# Patient Record
Sex: FEMALE | Race: WHITE | Employment: OTHER | URBAN - METROPOLITAN AREA
[De-identification: names, ages, dates, MRNs, and addresses within clinical notes are randomized per-mention and may not be internally consistent; named-entity substitution may affect disease eponyms.]

---

## 2018-09-08 ENCOUNTER — APPOINTMENT (OUTPATIENT)
Dept: GENERAL RADIOLOGY | Age: 83
End: 2018-09-08
Attending: EMERGENCY MEDICINE
Payer: MEDICARE

## 2018-09-08 ENCOUNTER — APPOINTMENT (OUTPATIENT)
Dept: CT IMAGING | Age: 83
End: 2018-09-08
Attending: EMERGENCY MEDICINE
Payer: MEDICARE

## 2018-09-08 ENCOUNTER — HOSPITAL ENCOUNTER (EMERGENCY)
Age: 83
Discharge: HOME | End: 2018-09-08
Attending: EMERGENCY MEDICINE
Payer: MEDICARE

## 2018-09-08 VITALS
TEMPERATURE: 97.8 F | OXYGEN SATURATION: 93 % | RESPIRATION RATE: 20 BRPM | HEART RATE: 84 BPM | DIASTOLIC BLOOD PRESSURE: 75 MMHG | SYSTOLIC BLOOD PRESSURE: 168 MMHG

## 2018-09-08 DIAGNOSIS — R09.82 POSTNASAL DRIP: ICD-10-CM

## 2018-09-08 DIAGNOSIS — R07.89 ATYPICAL CHEST PAIN: Primary | ICD-10-CM

## 2018-09-08 LAB
ALBUMIN SERPL-MCNC: 4.2 G/DL (ref 3.2–4.6)
ALBUMIN/GLOB SERPL: 0.9 {RATIO} (ref 1.2–3.5)
ALP SERPL-CCNC: 64 U/L (ref 50–136)
ALT SERPL-CCNC: 29 U/L (ref 12–65)
ANION GAP SERPL CALC-SCNC: 8 MMOL/L (ref 7–16)
AST SERPL-CCNC: 26 U/L (ref 15–37)
BACTERIA URNS QL MICRO: 0 /HPF
BASOPHILS # BLD: 0 K/UL (ref 0–0.2)
BASOPHILS NFR BLD: 1 % (ref 0–2)
BILIRUB SERPL-MCNC: 0.4 MG/DL (ref 0.2–1.1)
BNP SERPL-MCNC: 62 PG/ML
BUN SERPL-MCNC: 16 MG/DL (ref 8–23)
CALCIUM SERPL-MCNC: 10 MG/DL (ref 8.3–10.4)
CASTS URNS QL MICRO: 0 /LPF
CHLORIDE SERPL-SCNC: 102 MMOL/L (ref 98–107)
CO2 SERPL-SCNC: 29 MMOL/L (ref 21–32)
CREAT SERPL-MCNC: 1.02 MG/DL (ref 0.6–1)
DIFFERENTIAL METHOD BLD: NORMAL
EOSINOPHIL # BLD: 0.1 K/UL (ref 0–0.8)
EOSINOPHIL NFR BLD: 1 % (ref 0.5–7.8)
EPI CELLS #/AREA URNS HPF: NORMAL /HPF
ERYTHROCYTE [DISTWIDTH] IN BLOOD BY AUTOMATED COUNT: 12.3 %
GLOBULIN SER CALC-MCNC: 4.5 G/DL (ref 2.3–3.5)
GLUCOSE SERPL-MCNC: 108 MG/DL (ref 65–100)
HCT VFR BLD AUTO: 39.6 % (ref 35.8–46.3)
HGB BLD-MCNC: 13.4 G/DL (ref 11.7–15.4)
IMM GRANULOCYTES # BLD: 0 K/UL (ref 0–0.5)
IMM GRANULOCYTES NFR BLD AUTO: 0 % (ref 0–5)
LYMPHOCYTES # BLD: 2 K/UL (ref 0.5–4.6)
LYMPHOCYTES NFR BLD: 23 % (ref 13–44)
MCH RBC QN AUTO: 31.4 PG (ref 26.1–32.9)
MCHC RBC AUTO-ENTMCNC: 33.8 G/DL (ref 31.4–35)
MCV RBC AUTO: 92.7 FL (ref 79.6–97.8)
MONOCYTES # BLD: 0.7 K/UL (ref 0.1–1.3)
MONOCYTES NFR BLD: 8 % (ref 4–12)
NEUTS SEG # BLD: 5.9 K/UL (ref 1.7–8.2)
NEUTS SEG NFR BLD: 67 % (ref 43–78)
NRBC # BLD: 0 K/UL (ref 0–0.2)
PLATELET # BLD AUTO: 265 K/UL (ref 150–450)
PMV BLD AUTO: 9.8 FL (ref 9.4–12.3)
POTASSIUM SERPL-SCNC: 3.7 MMOL/L (ref 3.5–5.1)
PROT SERPL-MCNC: 8.7 G/DL (ref 6.3–8.2)
RBC # BLD AUTO: 4.27 M/UL (ref 4.05–5.2)
RBC #/AREA URNS HPF: NORMAL /HPF
SODIUM SERPL-SCNC: 139 MMOL/L (ref 136–145)
TROPONIN I SERPL-MCNC: 0.05 NG/ML (ref 0.02–0.05)
TROPONIN I SERPL-MCNC: 0.06 NG/ML (ref 0.02–0.05)
WBC # BLD AUTO: 8.9 K/UL (ref 4.3–11.1)
WBC URNS QL MICRO: NORMAL /HPF

## 2018-09-08 PROCEDURE — 84484 ASSAY OF TROPONIN QUANT: CPT

## 2018-09-08 PROCEDURE — 85025 COMPLETE CBC W/AUTO DIFF WBC: CPT

## 2018-09-08 PROCEDURE — 83880 ASSAY OF NATRIURETIC PEPTIDE: CPT

## 2018-09-08 PROCEDURE — 80053 COMPREHEN METABOLIC PANEL: CPT

## 2018-09-08 PROCEDURE — 74011636320 HC RX REV CODE- 636/320: Performed by: EMERGENCY MEDICINE

## 2018-09-08 PROCEDURE — 93005 ELECTROCARDIOGRAM TRACING: CPT | Performed by: EMERGENCY MEDICINE

## 2018-09-08 PROCEDURE — 99285 EMERGENCY DEPT VISIT HI MDM: CPT | Performed by: EMERGENCY MEDICINE

## 2018-09-08 PROCEDURE — 71046 X-RAY EXAM CHEST 2 VIEWS: CPT

## 2018-09-08 PROCEDURE — 81003 URINALYSIS AUTO W/O SCOPE: CPT | Performed by: EMERGENCY MEDICINE

## 2018-09-08 PROCEDURE — 74011000258 HC RX REV CODE- 258: Performed by: EMERGENCY MEDICINE

## 2018-09-08 PROCEDURE — 81015 MICROSCOPIC EXAM OF URINE: CPT

## 2018-09-08 PROCEDURE — 71260 CT THORAX DX C+: CPT

## 2018-09-08 RX ORDER — SODIUM CHLORIDE 0.9 % (FLUSH) 0.9 %
10 SYRINGE (ML) INJECTION
Status: COMPLETED | OUTPATIENT
Start: 2018-09-08 | End: 2018-09-08

## 2018-09-08 RX ORDER — FLUTICASONE PROPIONATE 50 MCG
2 SPRAY, SUSPENSION (ML) NASAL DAILY
Qty: 1 BOTTLE | Refills: 0 | Status: SHIPPED | OUTPATIENT
Start: 2018-09-08

## 2018-09-08 RX ADMIN — SODIUM CHLORIDE 100 ML: 900 INJECTION, SOLUTION INTRAVENOUS at 18:51

## 2018-09-08 RX ADMIN — IOPAMIDOL 100 ML: 755 INJECTION, SOLUTION INTRAVENOUS at 18:51

## 2018-09-08 RX ADMIN — Medication 10 ML: at 18:51

## 2018-09-08 NOTE — ED TRIAGE NOTES
Pt reports she has tightness in her chest and has trouble breathing when she lays down, has been going on for 2 nights per pt.

## 2018-09-08 NOTE — ED PROVIDER NOTES
HPI Comments: Patient is a 61-year-old female presenting with some difficulty breathing as well as tightness in her chest.  She reports she's had some nasal congestion the past few daysworse she lays down. She should use this to a woman in her [employer] that wears a great deal of perfume. She was a M[employer]D. 316 earlier today and mentioned she had some substernal chest pain as well which prompted them transferring her to the emergency department. Patient lives alone. She denies any recent fevers, confusion, leg swelling or vomiting. Patient is a 80 y.o. female presenting with shortness of breath. The history is provided by the patient. No  was used. Shortness of Breath Associated symptoms include chest pain. Pertinent negatives include no fever, no headaches, no vomiting, no abdominal pain and no rash. No past medical history on file. No past surgical history on file. No family history on file. Social History Social History  Marital status:  Spouse name: N/A  
 Number of children: N/A  
 Years of education: N/A Occupational History  Not on file. Social History Main Topics  Smoking status: Not on file  Smokeless tobacco: Not on file  Alcohol use Not on file  Drug use: Not on file  Sexual activity: Not on file Other Topics Concern  Not on file Social History Narrative ALLERGIES: Adhesive; Aspirin; Iodine; Niacin; and Penicillins Review of Systems Constitutional: Negative for fatigue and fever. HENT: Positive for congestion. Respiratory: Positive for chest tightness and shortness of breath. Cardiovascular: Positive for chest pain. Gastrointestinal: Negative for abdominal pain, nausea and vomiting. Musculoskeletal: Negative for back pain. Skin: Negative for rash. Neurological: Negative for headaches. Psychiatric/Behavioral: Negative for confusion. Vitals: 09/08/18 1344 09/08/18 1410 09/08/18 1430 BP: 167/67 (!) 214/84 155/72 Pulse: 95 92 78 Resp: 16 15 Temp: 98.3 °F (36.8 °C) SpO2: 94% 95% 93% Physical Exam  
Constitutional: She is oriented to person, place, and time. She appears well-developed and well-nourished. No distress. HENT:  
Head: Normocephalic and atraumatic. Eyes: Conjunctivae and EOM are normal. Pupils are equal, round, and reactive to light. Neck: Normal range of motion. Neck supple. Cardiovascular: Normal rate, regular rhythm and normal heart sounds. Pulmonary/Chest: Effort normal and breath sounds normal. No respiratory distress. She has no wheezes. She has no rales. Abdominal: Soft. She exhibits no distension. There is no tenderness. There is no rebound. Musculoskeletal: Normal range of motion. She exhibits no edema or tenderness. Neurological: She is alert and oriented to person, place, and time. Skin: Skin is warm and dry. No rash noted. She is not diaphoretic. Psychiatric: She has a normal mood and affect. Her behavior is normal.  
Vitals reviewed. MDM Number of Diagnoses or Management Options Atypical chest pain: new and does not require workup Postnasal drip: new and does not require workup Diagnosis management comments: Patient does appear well and is resting comfortably. Troponin very slightly elevated. Reports she has mild chest pressure. EKG shows no significant abnormalities. Oxygen sat somewhat lower. We'll proceed with CT scan of the chest to rule out pulmonary embolism. Second troponin ordered. Second troponin negative. CT scan negative. I suspect some postnasal drip contributing to shortness of breath. She reports she was coughing up some phlegm earlier this morning. We'll send home with Flonase. Discharged in stable condition. Return precautions discussed.  
 
 
Antonio Swanson MD; 9/8/2018 @7:23 PM Voice dictation software was used during the making of this note. This software is not perfect and grammatical and other typographical errors may be present. This note has not been proofread for errors. 
=================================================================== Amount and/or Complexity of Data Reviewed Clinical lab tests: reviewed and ordered (Results for orders placed or performed during the hospital encounter of 09/08/18 
-CBC WITH AUTOMATED DIFF Result                                            Value                         Ref Range WBC                                               8.9                           4.3 - 11.1 K/uL           
     RBC                                               4.27                          4.05 - 5.2 M/uL HGB                                               13.4                          11.7 - 15.4 g/dL HCT                                               39.6                          35.8 - 46.3 % MCV                                               92.7                          79.6 - 97.8 FL            
     MCH                                               31.4                          26.1 - 32.9 PG            
     MCHC                                              33.8                          31.4 - 35.0 g/dL RDW                                               12.3                          % PLATELET                                          265                           150 - 450 K/uL MPV                                               9.8                           9.4 - 12.3 FL ABSOLUTE NRBC                                     0.00                          0.0 - 0.2 K/uL            
     DF                                                AUTOMATED NEUTROPHILS                                       67                            43 - 78 % LYMPHOCYTES                                       23                            13 - 44 % MONOCYTES                                         8                             4.0 - 12.0 % EOSINOPHILS                                       1                             0.5 - 7.8 % BASOPHILS                                         1                             0.0 - 2.0 % IMMATURE GRANULOCYTES                             0                             0.0 - 5.0 %               
     ABS. NEUTROPHILS                                  5.9                           1.7 - 8.2 K/UL            
     ABS. LYMPHOCYTES                                  2.0                           0.5 - 4.6 K/UL            
     ABS. MONOCYTES                                    0.7                           0.1 - 1.3 K/UL            
     ABS. EOSINOPHILS                                  0.1                           0.0 - 0.8 K/UL            
     ABS. BASOPHILS                                    0.0                           0.0 - 0.2 K/UL            
     ABS. IMM. GRANS.                                  0.0                           0.0 - 0.5 K/UL            
-METABOLIC PANEL, COMPREHENSIVE Result                                            Value                         Ref Range Sodium                                            139                           136 - 145 mmol/L Potassium                                         3.7                           3.5 - 5.1 mmol/L      Chloride                                          102                           98 - 107 mmol/L           
     CO2                                               29                            21 - 32 mmol/L            
 Anion gap                                         8                             7 - 16 mmol/L Glucose                                           108 (H)                       65 - 100 mg/dL BUN                                               16                            8 - 23 MG/DL Creatinine                                        1.02 (H)                      0.6 - 1.0 MG/DL           
     GFR est AA                                        >60                           >60 ml/min/1.73m2 GFR est non-AA                                    54 (L)                        >60 ml/min/1.73m2 Calcium                                           10.0                          8.3 - 10.4 MG/DL Bilirubin, total                                  0.4                           0.2 - 1.1 MG/DL           
     ALT (SGPT)                                        29                            12 - 65 U/L               
     AST (SGOT)                                        26                            15 - 37 U/L Alk. phosphatase                                  64                            50 - 136 U/L Protein, total                                    8.7 (H)                       6.3 - 8.2 g/dL Albumin                                           4.2                           3.2 - 4.6 g/dL Globulin                                          4.5 (H)                       2.3 - 3.5 g/dL A-G Ratio                                         0.9 (L)                       1.2 - 3.5                 
-TROPONIN I Result                                            Value                         Ref Range Troponin-I, Qt.                                   0.06 (H)                      0.02 - 0.05 NG/ML         
-BNP Result                                            Value                         Ref Range BNP                                               62                            pg/mL                     
-URINE MICROSCOPIC Result                                            Value                         Ref Range WBC                                               3-5                           0 /hpf                    
     RBC                                               0-3                           0 /hpf Epithelial cells                                  0-3                           0 /hpf Bacteria                                          0                             0 /hpf Casts                                             0                             0 /lpf                    
-EKG, 12 LEAD, INITIAL Result                                            Value                         Ref Range Ventricular Rate                                  90                            BPM                       
     Atrial Rate                                       90                            BPM                       
     P-R Interval                                      188                           ms                        
     QRS Duration                                      90                            ms Q-T Interval                                      370                           ms                        
     QTC Calculation (Bezet)                           452                           ms                        
     Calculated P Axis                                 65                            degrees Calculated R Axis                                 40                            degrees Calculated T Axis                                 58                            degrees Diagnosis Normal sinus rhythm Nonspecific ST abnormality Abnormal ECG No previous ECGs available ) Tests in the radiology section of CPT®: ordered and reviewed (Xr Chest Pa Lat Result Date: 9/8/2018 Chest X-ray INDICATION:  Shortness of breath, chest pain PA and lateral views of the chest were obtained. FINDINGS: The lungs are clear. There are no infiltrates or effusions. The heart size is normal.  There are multiple thoracic compression fractures. IMPRESSION: No acute findings in the chest  
 
) Review and summarize past medical records: yes Independent visualization of images, tracings, or specimens: yes Risk of Complications, Morbidity, and/or Mortality Presenting problems: high Diagnostic procedures: moderate Management options: moderate Patient Progress Patient progress: stable ED Course Procedures

## 2018-09-08 NOTE — ED NOTES
I have reviewed discharge instructions with the patient. The patient verbalized understanding. Patient left ED via Discharge Method: ambulatory to Home with self and daughter. Opportunity for questions and clarification provided. Patient given 1 scripts. To continue your aftercare when you leave the hospital, you may receive an automated call from our care team to check in on how you are doing. This is a free service and part of our promise to provide the best care and service to meet your aftercare needs.  If you have questions, or wish to unsubscribe from this service please call [phone]. Thank you for Choosing our [facility].

## 2018-09-08 NOTE — ED NOTES
Troponin has been collected and sent to lab at this time. Patient to CT in stretcher with transport.

## 2018-09-08 NOTE — DISCHARGE INSTRUCTIONS
Chest Pain: Care Instructions  Your Care Instructions    There are many things that can cause chest pain. Some are not serious and will get better on their own in a few days. But some kinds of chest pain need more testing and treatment. Your doctor may have recommended a follow-up visit in the next 8 to 12 hours. If you are not getting better, you may need more tests or treatment. Even though your doctor has released you, you still need to watch for any problems. The doctor carefully checked you, but sometimes problems can develop later. If you have new symptoms or if your symptoms do not get better, get medical care right away. If you have worse or different chest pain or pressure that lasts more than 5 minutes or you passed out (lost consciousness), call 911 or seek other emergency help right away. A medical visit is only one step in your treatment. Even if you feel better, you still need to do what your doctor recommends, such as going to all suggested follow-up appointments and taking medicines exactly as directed. This will help you recover and help prevent future problems. How can you care for yourself at home? · Rest until you feel better. · Take your medicine exactly as prescribed. Call your doctor if you think you are having a problem with your medicine. · Do not drive after taking a prescription pain medicine. When should you call for help? Call 911 if:    · You passed out (lost consciousness).     · You have severe difficulty breathing.     · You have symptoms of a heart attack. These may include:  ¨ Chest pain or pressure, or a strange feeling in your chest.  ¨ Sweating. ¨ Shortness of breath. ¨ Nausea or vomiting. ¨ Pain, pressure, or a strange feeling in your back, neck, jaw, or upper belly or in one or both shoulders or arms. ¨ Lightheadedness or sudden weakness. ¨ A fast or irregular heartbeat.   After you call 911, the  may tell you to chew 1 adult-strength or 2 to 4 low-dose aspirin. Wait for an ambulance. Do not try to drive yourself.    Call your doctor today if:    · You have any trouble breathing.     · Your chest pain gets worse.     · You are dizzy or lightheaded, or you feel like you may faint.     · You are not getting better as expected.     · You are having new or different chest pain. Where can you learn more? Go to http://fabian-blanca.info/. Enter A120 in the search box to learn more about \"Chest Pain: Care Instructions. \"  Current as of: November 20, 2017  Content Version: 11.7  © 5495-1556 [employer]. Care instructions adapted under license by [employer] (which disclaims liability or warranty for this information). If you have questions about a medical condition or this instruction, always ask your healthcare professional. [address] any warranty or liability for your use of this information.

## 2018-09-08 NOTE — ED NOTES
Patient has returned from CT at this time. Patient resting in stretcher with cardiac monitoring, cycling vitals in place. Family at bedside.

## 2018-09-09 LAB
ATRIAL RATE: 90 BPM
CALCULATED P AXIS, ECG09: 65 DEGREES
CALCULATED R AXIS, ECG10: 40 DEGREES
CALCULATED T AXIS, ECG11: 58 DEGREES
DIAGNOSIS, 93000: NORMAL
P-R INTERVAL, ECG05: 188 MS
Q-T INTERVAL, ECG07: 370 MS
QRS DURATION, ECG06: 90 MS
QTC CALCULATION (BEZET), ECG08: 452 MS
VENTRICULAR RATE, ECG03: 90 BPM

## 2018-12-18 ENCOUNTER — HOSPITAL ENCOUNTER (OUTPATIENT)
Dept: GENERAL RADIOLOGY | Age: 83
Discharge: HOME | End: 2018-12-18
Attending: INTERNAL MEDICINE
Payer: MEDICARE

## 2018-12-18 DIAGNOSIS — R13.19 ESOPHAGEAL DYSPHAGIA: ICD-10-CM

## 2018-12-18 PROCEDURE — 74220 X-RAY XM ESOPHAGUS 1CNTRST: CPT

## 2018-12-18 PROCEDURE — 74011000255 HC RX REV CODE- 255: Performed by: INTERNAL MEDICINE

## 2018-12-18 PROCEDURE — 74011000250 HC RX REV CODE- 250: Performed by: INTERNAL MEDICINE

## 2018-12-18 RX ADMIN — ANTACID/ANTIFLATULENT 4 G: 380; 550; 10; 10 GRANULE, EFFERVESCENT ORAL at 10:26

## 2018-12-18 RX ADMIN — BARIUM SULFATE 700 MG: 700 TABLET ORAL at 10:31

## 2018-12-18 RX ADMIN — BARIUM SULFATE 355 ML: 0.6 SUSPENSION ORAL at 10:29

## 2018-12-18 RX ADMIN — BARIUM SULFATE 135 ML: 980 POWDER, FOR SUSPENSION ORAL at 10:26

## 2021-12-10 NOTE — PROGRESS NOTES
Called and confirmed procedure with patient for 12/13/21. Pt reports she had her COVID test.  will be present.

## 2021-12-12 ENCOUNTER — ANESTHESIA EVENT (OUTPATIENT)
Dept: ENDOSCOPY | Age: 86
DRG: 919 | End: 2021-12-12
Payer: MEDICARE

## 2021-12-13 ENCOUNTER — HOSPITAL ENCOUNTER (INPATIENT)
Age: 86
LOS: 1 days | Discharge: HOME | DRG: 919 | End: 2021-12-16
Attending: INTERNAL MEDICINE | Admitting: INTERNAL MEDICINE
Payer: MEDICARE

## 2021-12-13 ENCOUNTER — APPOINTMENT (OUTPATIENT)
Dept: CT IMAGING | Age: 86
DRG: 919 | End: 2021-12-13
Attending: INTERNAL MEDICINE
Payer: MEDICARE

## 2021-12-13 ENCOUNTER — ANESTHESIA (OUTPATIENT)
Dept: ENDOSCOPY | Age: 86
DRG: 919 | End: 2021-12-13
Payer: MEDICARE

## 2021-12-13 ENCOUNTER — APPOINTMENT (OUTPATIENT)
Dept: GENERAL RADIOLOGY | Age: 86
DRG: 919 | End: 2021-12-13
Attending: INTERNAL MEDICINE
Payer: MEDICARE

## 2021-12-13 DIAGNOSIS — S11.21XA TEAR OF ESOPHAGUS, INITIAL ENCOUNTER: ICD-10-CM

## 2021-12-13 LAB
ANION GAP SERPL CALC-SCNC: 5 MMOL/L (ref 7–16)
BUN SERPL-MCNC: 18 MG/DL (ref 8–23)
CALCIUM SERPL-MCNC: 9.9 MG/DL (ref 8.3–10.4)
CHLORIDE SERPL-SCNC: 108 MMOL/L (ref 98–107)
CO2 SERPL-SCNC: 27 MMOL/L (ref 21–32)
CREAT SERPL-MCNC: 0.93 MG/DL (ref 0.6–1)
ERYTHROCYTE [DISTWIDTH] IN BLOOD BY AUTOMATED COUNT: 12.8 % (ref 11.9–14.6)
GLUCOSE SERPL-MCNC: 88 MG/DL (ref 65–100)
HCT VFR BLD AUTO: 37.1 % (ref 35.8–46.3)
HGB BLD-MCNC: 12.3 G/DL (ref 11.7–15.4)
MCH RBC QN AUTO: 30.6 PG (ref 26.1–32.9)
MCHC RBC AUTO-ENTMCNC: 33.2 G/DL (ref 31.4–35)
MCV RBC AUTO: 92.3 FL (ref 79.6–97.8)
NRBC # BLD: 0 K/UL (ref 0–0.2)
PLATELET # BLD AUTO: 206 K/UL (ref 150–450)
PMV BLD AUTO: 10 FL (ref 9.4–12.3)
POTASSIUM SERPL-SCNC: 4.4 MMOL/L (ref 3.5–5.1)
RBC # BLD AUTO: 4.02 M/UL (ref 4.05–5.2)
SODIUM SERPL-SCNC: 140 MMOL/L (ref 136–145)
WBC # BLD AUTO: 8.9 K/UL (ref 4.3–11.1)

## 2021-12-13 PROCEDURE — 74011250636 HC RX REV CODE- 250/636: Performed by: STUDENT IN AN ORGANIZED HEALTH CARE EDUCATION/TRAINING PROGRAM

## 2021-12-13 PROCEDURE — 80048 BASIC METABOLIC PNL TOTAL CA: CPT

## 2021-12-13 PROCEDURE — 76060000031 HC ANESTHESIA FIRST 0.5 HR: Performed by: INTERNAL MEDICINE

## 2021-12-13 PROCEDURE — 36415 COLL VENOUS BLD VENIPUNCTURE: CPT

## 2021-12-13 PROCEDURE — 74011000258 HC RX REV CODE- 258: Performed by: INTERNAL MEDICINE

## 2021-12-13 PROCEDURE — 76040000025: Performed by: INTERNAL MEDICINE

## 2021-12-13 PROCEDURE — 74011250636 HC RX REV CODE- 250/636: Performed by: INTERNAL MEDICINE

## 2021-12-13 PROCEDURE — 2709999900 HC NON-CHARGEABLE SUPPLY: Performed by: INTERNAL MEDICINE

## 2021-12-13 PROCEDURE — G0378 HOSPITAL OBSERVATION PER HR: HCPCS

## 2021-12-13 PROCEDURE — 74011250636 HC RX REV CODE- 250/636: Performed by: NURSE ANESTHETIST, CERTIFIED REGISTERED

## 2021-12-13 PROCEDURE — 71250 CT THORAX DX C-: CPT

## 2021-12-13 PROCEDURE — 94762 N-INVAS EAR/PLS OXIMTRY CONT: CPT

## 2021-12-13 PROCEDURE — 77030040361 HC SLV COMPR DVT MDII -B

## 2021-12-13 PROCEDURE — 85027 COMPLETE CBC AUTOMATED: CPT

## 2021-12-13 PROCEDURE — 0DJ08ZZ INSPECTION OF UPPER INTESTINAL TRACT, VIA NATURAL OR ARTIFICIAL OPENING ENDOSCOPIC: ICD-10-PCS | Performed by: INTERNAL MEDICINE

## 2021-12-13 PROCEDURE — 74011000250 HC RX REV CODE- 250: Performed by: NURSE ANESTHETIST, CERTIFIED REGISTERED

## 2021-12-13 PROCEDURE — 2709999900 HC NON-CHARGEABLE SUPPLY

## 2021-12-13 RX ORDER — LORATADINE AND PSEUDOEPHEDRINE SULFATE 10; 240 MG/1; MG/1
1 TABLET, EXTENDED RELEASE ORAL DAILY
COMMUNITY

## 2021-12-13 RX ORDER — AMLODIPINE BESYLATE 10 MG/1
10 TABLET ORAL DAILY
Status: DISCONTINUED | OUTPATIENT
Start: 2021-12-14 | End: 2021-12-16 | Stop reason: HOSPADM

## 2021-12-13 RX ORDER — SODIUM CHLORIDE 9 MG/ML
75 INJECTION, SOLUTION INTRAVENOUS CONTINUOUS
Status: DISCONTINUED | OUTPATIENT
Start: 2021-12-13 | End: 2021-12-16 | Stop reason: HOSPADM

## 2021-12-13 RX ORDER — SODIUM CHLORIDE 0.9 % (FLUSH) 0.9 %
5-40 SYRINGE (ML) INJECTION EVERY 8 HOURS
Status: DISCONTINUED | OUTPATIENT
Start: 2021-12-13 | End: 2021-12-16 | Stop reason: HOSPADM

## 2021-12-13 RX ORDER — SODIUM CHLORIDE 0.9 % (FLUSH) 0.9 %
5-40 SYRINGE (ML) INJECTION AS NEEDED
Status: DISCONTINUED | OUTPATIENT
Start: 2021-12-13 | End: 2021-12-16 | Stop reason: HOSPADM

## 2021-12-13 RX ORDER — LOSARTAN POTASSIUM AND HYDROCHLOROTHIAZIDE 12.5; 1 MG/1; MG/1
1 TABLET ORAL DAILY
COMMUNITY

## 2021-12-13 RX ORDER — PROPOFOL 10 MG/ML
INJECTION, EMULSION INTRAVENOUS
Status: DISCONTINUED | OUTPATIENT
Start: 2021-12-13 | End: 2021-12-13 | Stop reason: HOSPADM

## 2021-12-13 RX ORDER — LIDOCAINE HYDROCHLORIDE 20 MG/ML
INJECTION, SOLUTION EPIDURAL; INFILTRATION; INTRACAUDAL; PERINEURAL AS NEEDED
Status: DISCONTINUED | OUTPATIENT
Start: 2021-12-13 | End: 2021-12-13 | Stop reason: HOSPADM

## 2021-12-13 RX ORDER — ATORVASTATIN CALCIUM 20 MG/1
20 TABLET, FILM COATED ORAL DAILY
Status: DISCONTINUED | OUTPATIENT
Start: 2021-12-14 | End: 2021-12-16 | Stop reason: HOSPADM

## 2021-12-13 RX ORDER — SODIUM CHLORIDE, SODIUM LACTATE, POTASSIUM CHLORIDE, CALCIUM CHLORIDE 600; 310; 30; 20 MG/100ML; MG/100ML; MG/100ML; MG/100ML
100 INJECTION, SOLUTION INTRAVENOUS CONTINUOUS
Status: DISCONTINUED | OUTPATIENT
Start: 2021-12-13 | End: 2021-12-16 | Stop reason: HOSPADM

## 2021-12-13 RX ORDER — ESOMEPRAZOLE MAGNESIUM 20 MG/1
20 FOR SUSPENSION ORAL DAILY
COMMUNITY

## 2021-12-13 RX ORDER — IPRATROPIUM BROMIDE AND ALBUTEROL SULFATE 2.5; .5 MG/3ML; MG/3ML
3 SOLUTION RESPIRATORY (INHALATION)
Status: DISCONTINUED | OUTPATIENT
Start: 2021-12-13 | End: 2021-12-16 | Stop reason: HOSPADM

## 2021-12-13 RX ORDER — AMLODIPINE BESYLATE AND ATORVASTATIN CALCIUM 10; 10 MG/1; MG/1
1 TABLET, FILM COATED ORAL DAILY
COMMUNITY

## 2021-12-13 RX ORDER — SPIRONOLACTONE 25 MG
500 TABLET ORAL DAILY
COMMUNITY

## 2021-12-13 RX ORDER — ALBUTEROL SULFATE 90 UG/1
2 AEROSOL, METERED RESPIRATORY (INHALATION)
COMMUNITY

## 2021-12-13 RX ORDER — ATORVASTATIN CALCIUM 20 MG/1
20 TABLET, FILM COATED ORAL DAILY
COMMUNITY

## 2021-12-13 RX ORDER — SODIUM CHLORIDE, SODIUM LACTATE, POTASSIUM CHLORIDE, CALCIUM CHLORIDE 600; 310; 30; 20 MG/100ML; MG/100ML; MG/100ML; MG/100ML
100 INJECTION, SOLUTION INTRAVENOUS CONTINUOUS
Status: DISCONTINUED | OUTPATIENT
Start: 2021-12-13 | End: 2021-12-13

## 2021-12-13 RX ORDER — ACETAMINOPHEN 325 MG/1
650 TABLET ORAL
COMMUNITY

## 2021-12-13 RX ORDER — FLUTICASONE PROPIONATE 50 MCG
2 SPRAY, SUSPENSION (ML) NASAL DAILY
Status: DISCONTINUED | OUTPATIENT
Start: 2021-12-14 | End: 2021-12-16 | Stop reason: HOSPADM

## 2021-12-13 RX ORDER — GLUCOSAMINE SULFATE 1500 MG
1000 POWDER IN PACKET (EA) ORAL DAILY
COMMUNITY

## 2021-12-13 RX ADMIN — Medication 5 ML: at 17:16

## 2021-12-13 RX ADMIN — PROPOFOL 40 MCG/KG/MIN: 10 INJECTION, EMULSION INTRAVENOUS at 10:41

## 2021-12-13 RX ADMIN — Medication 10 ML: at 21:15

## 2021-12-13 RX ADMIN — LIDOCAINE HYDROCHLORIDE 40 MG: 20 INJECTION, SOLUTION EPIDURAL; INFILTRATION; INTRACAUDAL; PERINEURAL at 10:41

## 2021-12-13 RX ADMIN — SODIUM CHLORIDE 75 ML/HR: 900 INJECTION, SOLUTION INTRAVENOUS at 16:51

## 2021-12-13 RX ADMIN — PIPERACILLIN SODIUM AND TAZOBACTAM SODIUM 3.38 G: 3; .375 INJECTION, POWDER, LYOPHILIZED, FOR SOLUTION INTRAVENOUS at 17:15

## 2021-12-13 RX ADMIN — SODIUM CHLORIDE, SODIUM LACTATE, POTASSIUM CHLORIDE, AND CALCIUM CHLORIDE: 600; 310; 30; 20 INJECTION, SOLUTION INTRAVENOUS at 10:33

## 2021-12-13 NOTE — H&P
Gastrointestinal Progress Note    12/13/2021    Admit Date: 12/13/2021    Subjective:     Patient is NPO for an EGD with dilatation (dysphagia)     Pain: Patient complains of no abdominal pain. Bowel Movements: Normal    Bleeding:  None    Current Facility-Administered Medications   Medication Dose Route Frequency    lactated Ringers infusion  100 mL/hr IntraVENous CONTINUOUS        Objective:     Blood pressure (!) 176/66, pulse 76, temperature 98.1 °F (36.7 °C), resp. rate 18, height 4' 11.5\" (1.511 m), weight 60.8 kg (134 lb), SpO2 100 %. No intake/output data recorded. No intake/output data recorded. EXAM:  HEART: regular rate and rhythm, S1, S2 normal, no murmur, click, rub or gallop, LUNGS: chest clear, no wheezing, rales, normal symmetric air entry, Heart exam - S1, S2 normal, no murmur, no gallop, rate regular and ABDOMEN:  Bowel sounds are normal, liver is not enlarged, spleen is not enlarged    Data Review  No results found for this or any previous visit (from the past 24 hour(s)). Assessment:     Active Problems:  Dysphagia  Subtle proximal web/proximal rings  HTN    Plan:     EGD with dilatation--Risks (Bleed, perforation, infection, untoward CV or pulm. Events, mortality, etc) benefits, and alternatives discussed with patient, who agrees to proceed.   Saint Kidd, MD

## 2021-12-13 NOTE — ANESTHESIA PREPROCEDURE EVALUATION
Relevant Problems   No relevant active problems       Anesthetic History   No history of anesthetic complications            Review of Systems / Medical History  Patient summary reviewed and pertinent labs reviewed    Pulmonary            Asthma : well controlled       Neuro/Psych   Within defined limits           Cardiovascular    Hypertension          Hyperlipidemia    Exercise tolerance: <4 METS     GI/Hepatic/Renal     GERD: well controlled           Endo/Other  Within defined limits           Other Findings              Physical Exam    Airway  Mallampati: I  TM Distance: 4 - 6 cm  Neck ROM: normal range of motion   Mouth opening: Normal     Cardiovascular  Regular rate and rhythm,  S1 and S2 normal,  no murmur, click, rub, or gallop             Dental    Dentition: Full lower dentures and Implants     Pulmonary  Breath sounds clear to auscultation               Abdominal         Other Findings            Anesthetic Plan    ASA: 2  Anesthesia type: total IV anesthesia          Induction: Intravenous  Anesthetic plan and risks discussed with: Patient and Family

## 2021-12-13 NOTE — PROGRESS NOTES
TRANSFER - IN REPORT:    Verbal report received from Eliana(name) on Shameka Harden  being received from [facility](unit) for routine progression of care      Report consisted of patients Situation, Background, Assessment and   Recommendations(SBAR). Information from the following report(s) SBAR was reviewed with the receiving nurse. Opportunity for questions and clarification was provided. Assessment completed upon patients arrival to unit and care assumed.

## 2021-12-13 NOTE — PERIOP NOTES
TRANSFER - OUT REPORT:    Verbal report given to [address] (name) on Moises Rothman  being transferred to [facility] [phone] (unit) for routine progression of care       Report consisted of patients Situation, Background, Assessment and   Recommendations(SBAR). Information from the following report(s) Kardex, Procedure Summary, Intake/Output and Recent Results was reviewed with the receiving nurse. Lines:   Peripheral IV 12/13/21 Anterior; Right Forearm (Active)   Site Assessment Clean, dry, & intact 12/13/21 1100   Phlebitis Assessment 0 12/13/21 1100   Infiltration Assessment 0 12/13/21 1100   Dressing Status Clean, dry, & intact 12/13/21 1100   Dressing Type Tape; Transparent 12/13/21 1100   Hub Color/Line Status Blue 12/13/21 1100        Opportunity for questions and clarification was provided.       Patient transported with:  Registered Nurse

## 2021-12-13 NOTE — PROGRESS NOTES
12/13/21 1610   Dual Skin Pressure Injury Assessment   Dual Skin Pressure Injury Assessment WDL   Second Care Provider (Based on [address] [place] [address]) Musa Hyman RN   pt has no noted skin breakdown on sacrum or heels. Skin is intact.

## 2021-12-13 NOTE — ANESTHESIA POSTPROCEDURE EVALUATION
Procedure(s):  ESOPHAGOGASTRODUODENOSCOPY (EGD) W/ DILATION UNDER FLOURO/ BMI 27. total IV anesthesia    Anesthesia Post Evaluation      Multimodal analgesia: multimodal analgesia used between 6 hours prior to anesthesia start to PACU discharge  Patient location during evaluation: bedside  Patient participation: complete - patient participated  Level of consciousness: awake and alert  Pain management: adequate  Airway patency: patent  Anesthetic complications: no  Cardiovascular status: acceptable  Respiratory status: acceptable  Hydration status: acceptable  Post anesthesia nausea and vomiting:  controlled  Final Post Anesthesia Temperature Assessment:  Normothermia (36.0-37.5 degrees C)      INITIAL Post-op Vital signs:   Vitals Value Taken Time   /66 12/13/21 1301   Temp 36.7 °C (98 °F) 12/13/21 1100   Pulse 92 12/13/21 1305   Resp 18 12/13/21 1241   SpO2 98 % 12/13/21 1305   Vitals shown include unvalidated device data.

## 2021-12-13 NOTE — PROGRESS NOTES
[facility] Progress Note         Admit Date:  12/13/2021    Today's Date:  12/13/2021    CC:  Submucosal air following EGD    Subjective:     Patient is s/p EGD without dilation with CT scan findings below. Denies any abdominal pain, nausea, or vomiting currently. CT neck/chest without contrast 12/13/2021  FINDINGS:    NECK:  Intracranial contents: Grossly unremarkable. Globes and orbits: Symmetric and unremarkable. Paranasal sinuses: Clear. Mastoid air cells: Also clear. Parotid gland: Uniform attenuation. Submandibular glands: Bilaterally small. Nasopharynx: Unremarkable. Oral cavity: No mass identified. Larynx: Symmetric. Thyroid: Uniform and normal size. Lymph nodes: No cervical adenopathy. Bones: Moderate degenerative change throughout. Vascular: There are \"kissing\" carotids with a medial course.     CHEST:  -Esophagus: There is gas within the esophagus and linear vertical striated  densities. This extends from the thoracic inlet to the level of the aortic arch. No gas in the mediastinum. No gas tracking upward into the strap muscles of the  neck.      -Lungs: No infiltrates, masses, or effusions. No pneumothorax.  -Pleura: No pleural effusion. No pneumothorax. -Mediastinum/Axilla: No significant adenopathy.  -Heart/Vessels: Coronary and aortic calcifications. Mitral valvuloplasty.  -Chest Wall/Bones: No gross bony abnormality.     IMPRESSION  In the esophagus, from the thoracic inlet to the level of the aortic  arch, there are thin intraluminal densities as above. This could represent  material such as food or blood (clot) within the esophagus or possibly  submucosal gas from an ulcer or tear. No pneumomediastinum. No pneumothorax. No  abnormal fluid collections. No pleural effusion.   689          Medications:   Current Facility-Administered Medications   Medication Dose Route Frequency    lactated Ringers infusion  100 mL/hr IntraVENous CONTINUOUS       Review of Systems:  ROS was obtained, with pertinent positives as listed above. No chest pain or SOB. Diet:  NPO    Objective:   Vitals:  Visit Vitals  BP (!) 175/73   Pulse 77   Temp 98 °F (36.7 °C)   Resp 18   Ht 4' 11.5\" (1.511 m)   Wt 60.8 kg (134 lb)   SpO2 96%   BMI 26.61 kg/m²     Intake/Output:  12/13 0701 - 12/13 1900  In: 200 [I.V.:200]  Out: 0   No intake/output data recorded. Exam:  General appearance: alert, cooperative, no distress  Lungs: clear to auscultation bilaterally anteriorly  Heart: regular rate and rhythm  Abdomen: soft, non-tender. Bowel sounds normal. No masses, no organomegaly  Extremities: extremities normal, atraumatic, no cyanosis or edema  Neuro:  alert and oriented    Data Review (Labs):    No results for input(s): WBC, HGB, HCT, PLT, MCV, NA, K, CL, CO2, BUN, CREA, CA, MG, GLU, AP, AST, ALT, TBIL, TBILI, CBIL, ALB, TP, AML, LPSE, PTP, INR, APTT, HGBEXT, HCTEXT, PLTEXT, INREXT in the last 72 hours. No lab exists for component: GPT, DBIL    Assessment:     Dysphagia  Subtle proximal web/proximal rings  HTN    81 yo female who presented today for outpatient EGD for dysphagia. No dilation was performed due to esophageal tear seen on EGD. CT scan was performed with findings of proximal Esophageal submucosal air seen. Plan:     Admit to obs  General Surgery consult  Zosyn   NPO for now  Hold PO medications  IVF  CBC, BMP      Henri Kelley NP    Patient is seen and examined in collaboration with Dr. Julio Pizarro. Assessment and plan as per Dr. Julio Pizarro. GI--Patient seen and examined. Agree with above. Patient denies any neck or chest pain; no crepitance on exam.  At EGD, a proximal web with a mucosal tear was noted as we advanced the endoscope under direct visualization though the cricopharynx. CT as noted. The patient feels well, but b/o the CT findings and the patient's age, we will admit and place on antibiotics. Surgery consult to be obtained.   Vicki Blunt MD

## 2021-12-14 PROCEDURE — G0378 HOSPITAL OBSERVATION PER HR: HCPCS

## 2021-12-14 PROCEDURE — 74011250637 HC RX REV CODE- 250/637: Performed by: INTERNAL MEDICINE

## 2021-12-14 PROCEDURE — 2709999900 HC NON-CHARGEABLE SUPPLY

## 2021-12-14 PROCEDURE — 96374 THER/PROPH/DIAG INJ IV PUSH: CPT

## 2021-12-14 PROCEDURE — 74011250636 HC RX REV CODE- 250/636: Performed by: INTERNAL MEDICINE

## 2021-12-14 PROCEDURE — 99222 1ST HOSP IP/OBS MODERATE 55: CPT | Performed by: SURGERY

## 2021-12-14 PROCEDURE — 74011000258 HC RX REV CODE- 258: Performed by: INTERNAL MEDICINE

## 2021-12-14 RX ORDER — PANTOPRAZOLE SODIUM 40 MG/1
40 TABLET, DELAYED RELEASE ORAL
Status: DISCONTINUED | OUTPATIENT
Start: 2021-12-15 | End: 2021-12-16 | Stop reason: HOSPADM

## 2021-12-14 RX ADMIN — Medication 10 ML: at 05:11

## 2021-12-14 RX ADMIN — Medication 10 ML: at 13:37

## 2021-12-14 RX ADMIN — PIPERACILLIN SODIUM AND TAZOBACTAM SODIUM 3.38 G: 3; .375 INJECTION, POWDER, LYOPHILIZED, FOR SOLUTION INTRAVENOUS at 17:34

## 2021-12-14 RX ADMIN — SODIUM CHLORIDE 75 ML/HR: 900 INJECTION, SOLUTION INTRAVENOUS at 21:06

## 2021-12-14 RX ADMIN — Medication 10 ML: at 21:07

## 2021-12-14 RX ADMIN — PIPERACILLIN SODIUM AND TAZOBACTAM SODIUM 3.38 G: 3; .375 INJECTION, POWDER, LYOPHILIZED, FOR SOLUTION INTRAVENOUS at 09:00

## 2021-12-14 RX ADMIN — FLUTICASONE PROPIONATE 2 SPRAY: 50 SPRAY, METERED NASAL at 08:59

## 2021-12-14 RX ADMIN — PIPERACILLIN SODIUM AND TAZOBACTAM SODIUM 3.38 G: 3; .375 INJECTION, POWDER, LYOPHILIZED, FOR SOLUTION INTRAVENOUS at 01:33

## 2021-12-14 NOTE — PROGRESS NOTES
Pt is having low diastolic blood pressure like 149/49, 163/59, 158/44. Pt is asymptomatic, A/O x4. PS Dr. Nicole Bradley to make aware. Will await any orders and continue to monitor care.

## 2021-12-14 NOTE — PROGRESS NOTES
[facility] Progress Note         Admit Date:  12/13/2021    Today's Date:  12/14/2021    CC:  Esophageal Tear    Subjective:     Patient is sitting in a chair this morning. Denies any abdominal pain, chest pain, nausea, or vomiting. NO acute events overnight. EGD 12/13/2021 with Dr. Fain Libman:  IMPRESSION:  1. 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 2.  Tear noted as the endoscope was passed through the cricopharyngeus to the web. Procedure terminated.     PLAN:  Diagnostics:  1.  CT of the neck and chest for completeness, although the patient has no complaints at this time. 2.  Advance diet slowly if the CT scan shows no obvious leak.   3.  Repeat EGD in the future with pediatric upper scope using a Savary guidewire and fluoroscopy after Savary guidewire placement.        DANIEL CAGE MD       Medications:   Current Facility-Administered Medications   Medication Dose Route Frequency    lactated Ringers infusion  100 mL/hr IntraVENous CONTINUOUS    sodium chloride (NS) flush 5-40 mL  5-40 mL IntraVENous Q8H    sodium chloride (NS) flush 5-40 mL  5-40 mL IntraVENous PRN    albuterol-ipratropium (DUO-NEB) 2.5 MG-0.5 MG/3 ML  3 mL Nebulization Q6H PRN    fluticasone propionate (FLONASE) 50 mcg/actuation nasal spray 2 Spray  2 Spray Both Nostrils DAILY    sodium chloride (NS) flush 5-40 mL  5-40 mL IntraVENous Q8H    sodium chloride (NS) flush 5-40 mL  5-40 mL IntraVENous PRN    piperacillin-tazobactam (ZOSYN) 3.375 g in 0.9% sodium chloride (MBP/ADV) 100 mL MBP  3.375 g IntraVENous Q8H    0.9% sodium chloride infusion  75 mL/hr IntraVENous CONTINUOUS    influenza vaccine 2021-22 (6 mos+)(PF) (FLUARIX/FLULAVAL/FLUZONE QUAD) injection 0.5 mL  1 Each IntraMUSCular PRIOR TO DISCHARGE    amLODIPine (NORVASC) tablet 10 mg  10 mg Oral DAILY    atorvastatin (LIPITOR) tablet 20 mg  20 mg Oral DAILY       Review of Systems:  ROS was obtained, with pertinent positives as listed above.  No chest pain or SOB. Diet:      Objective:   Vitals:  Visit Vitals  BP (!) 149/68 (BP 1 Location: Left upper arm, BP Patient Position: At rest)   Pulse 64   Temp 97.7 °F (36.5 °C)   Resp 16   Ht 4' 11.5\" (1.511 m)   Wt 60.8 kg (134 lb)   SpO2 96%   BMI 26.61 kg/m²     Intake/Output:  No intake/output data recorded. 12/12 1901 - 12/14 0700  In: 200 [I.V.:200]  Out: 0   Exam:  General appearance: alert, cooperative, no distress, sitting in a chair  Lungs: clear to auscultation bilaterally anteriorly  Heart: regular rate and rhythm  Abdomen: soft, non-tender. Bowel sounds normal. No masses, no organomegaly  Extremities: extremities normal, atraumatic, no cyanosis or edema  Neuro:  alert and oriented    Data Review (Labs):    Recent Labs     12/13/21  1837   WBC 8.9   HGB 12.3   HCT 37.1      MCV 92.3      K 4.4   *   CO2 27   BUN 18   CREA 0.93   CA 9.9   GLU 88       CT Neck/Chest  FINDINGS:    NECK:  Intracranial contents: Grossly unremarkable. Globes and orbits: Symmetric and unremarkable. Paranasal sinuses: Clear. Mastoid air cells: Also clear. Parotid gland: Uniform attenuation. Submandibular glands: Bilaterally small. Nasopharynx: Unremarkable. Oral cavity: No mass identified. Larynx: Symmetric. Thyroid: Uniform and normal size. Lymph nodes: No cervical adenopathy. Bones: Moderate degenerative change throughout. Vascular: There are \"kissing\" carotids with a medial course.     CHEST:  -Esophagus: There is gas within the esophagus and linear vertical striated  densities. This extends from the thoracic inlet to the level of the aortic arch. No gas in the mediastinum. No gas tracking upward into the strap muscles of the  neck.      -Lungs: No infiltrates, masses, or effusions. No pneumothorax.  -Pleura: No pleural effusion. No pneumothorax. -Mediastinum/Axilla: No significant adenopathy.  -Heart/Vessels: Coronary and aortic calcifications.  Mitral valvuloplasty.  -Chest Wall/Bones: No gross bony abnormality.     IMPRESSION  In the esophagus, from the thoracic inlet to the level of the aortic  arch, there are thin intraluminal densities as above. This could represent  material such as food or blood (clot) within the esophagus or possibly  submucosal gas from an ulcer or tear. No pneumomediastinum. No pneumothorax. No  abnormal fluid collections. No pleural effusion. 689       Assessment:     Active Problems:    Esophageal tear (12/13/2021)      81 yo female who presented today for outpatient EGD for dysphagia. No dilation was performed due to esophageal tear seen on EGD. CT scan was performed with findings of proximal Esophageal submucosal air seen.           Plan:     PO meds are on hold- will resume once Surgery sees her and gives clearance for PO intake. NPO  IVF  Awaiting Surgery consultation  Continue with Adrián Kelley NP    Patient is seen and examined in collaboration with Dr. Julio Pizarro. Assessment and plan as per Dr. Julio Pizarro. GI--patient seen and examined. Agree with above. Appreciate surgery input. Patient doing well without pain or fever. Diet started. Activity advanced.   Thanks Vicki Blunt MD

## 2021-12-14 NOTE — OP NOTES
[facility]  OPERATIVE REPORT    Name:  Carle Bosworth  MR#:  [id number]  :  1927  ACCOUNT #:  [de-identified]  DATE OF SERVICE:  2021    PREOPERATIVE DIAGNOSIS:  Dysphagia. POSTOPERATIVE DIAGNOSIS:  Proximal esophageal web. PROCEDURE PERFORMED:  Esophagoscopy. PRIMARY GASTROENTEROLOGIST:  Analisa Johnson. MD Lars    SURGEON:  None    ASSISTANT:  None. ANESTHESIA:  Per MAC anesthesia. COMPLICATIONS:  Esophageal tear just distal to the cricopharyngeus and just proximal to the esophageal web. SPECIMENS REMOVED:  To laboratory, none. IMPLANTS:  None. ESTIMATED BLOOD LOSS:  0-1 mL. INSTRUMENT:  GIF-H190 video endoscope. PROCEDURE NOTE:  The patient was anesthetized and positioned. The video endoscope was passed through the hypopharynx and esophagus with minimal difficulty. At this point in time, we encountered a very tight esophageal web. Immediately, there appeared to be a tear just distal to the cricopharyngeus and just proximal to the esophageal web. This mucosal tear was noted as the endoscope was just beginning to be attempted to be advanced into the esophagus. Because of this  tear, we elected to not proceed with any further endoscopy or perform any dilatation. The endoscope was removed from the patient. The patient tolerated the procedure well and was transferred from the fluoroscopy suite in stable condition with no complaints. IMPRESSION:  1. 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 2.  Tear noted as the endoscope was passed through the cricopharyngeus to the web. Procedure terminated. PLAN:  Diagnostics:  1.  CT of the neck and chest for completeness, although the patient has no complaints at this time. 2.  Advance diet slowly if the CT scan shows no obvious leak. 3.  Repeat EGD in the future with pediatric upper scope using a Savary guidewire and fluoroscopy after Savary guidewire placement.       DANIEL CAGE, MD      [id number]  D:  12/13/2021 11:20  T:  12/13/2021 20:16  JOB #:  [id number]  CC:  [facility]       MD Susan Roberts MD

## 2021-12-14 NOTE — PROGRESS NOTES
Assumed [id number]. A/o, VSS. No new complaints at this time. Bed locked and low. Call bell, phone, and Tv remote within reach. Safety precautions in place. Hourly rounding completed on shift. All needs met. Will give report to oncoming nurse.

## 2021-12-14 NOTE — PROGRESS NOTES
CM met with patient to complete assessment. Patient presented alert and oriented. Demographic information verified by patient and daughter Rossy Carpenter [phone]. Patient resides alone in a one story home with 1 step at the main entrance. At baseline, the patient is independent with completing ADL's. Patient confirmed DME such as a cane, that is used as needed. Pharmacy: [address] [place] [address] on [facility]. Discharge planning: PT/OT has not been consulted. Patient confirmed a history of [facility] services and outpatient rehab. Patient denies any discharge/supportive care needs at this time. Patient to return home when stable for discharge. CM continues to follow. Care Management Interventions  PCP Verified by CM: Yes  Mode of Transport at Discharge: Other (see comment) (Jim Carpenter [phone])  Transition of Care Consult (CM Consult): Discharge Planning  Discharge Durable Medical Equipment: No  Physical Therapy Consult: No  Occupational Therapy Consult: No  Speech Therapy Consult: No  Support Systems: Child(katherine)  Confirm Follow Up Transport: Family  The Plan for Transition of Care is Related to the Following Treatment Goals : Return to baseline. The Patient and/or Patient Representative was Provided with a Choice of Provider and Agrees with the Discharge Plan?: Yes  Name of the Patient Representative Who was Provided with a Choice of Provider and Agrees with the Discharge Plan: Patient/ Patient's Daughter Rossy Carpenter [phone].    Resource Information Provided?: No  Discharge Location  Discharge Placement: Home

## 2021-12-14 NOTE — CONSULTS
Consult received last evening. Chart reviewed. Submucosal air/?fluid on CT neck after tear noted by EGD    No evidence of sepsis. Recommend clear liquids x 72 hours, +/- abx  Full consult to follow. Surgery Consult      Patient: Kate Bernheim 80 y.o. female     Consulting Physician:  Dr Rogena Meigs    Chief Complaint: esophageal perforation    Subjective:      Kate Bernheim is a 80 y.o. female who surgery has been asked to see for evaluation of an esophageal tear at EGD on 12/13. This is described as acute . Onset was 12/13. Symptoms have been controlled since. Aggravating factors: known stricture/web- planned EGD w/dilation but dilation aborted when mucosal tear noted. Alleviating factors: none. Associated symptoms: none. The patient denies fever, chills, dysphagia, chest pain, dyspnea, hematemesis. Past Medical History:   Diagnosis Date    Chronic pain        Past Surgical History:   Procedure Laterality Date    HX ORTHOPAEDIC  2014    left hip    HX ORTHOPAEDIC  1997    left arm        No family history on file.     Social History     Socioeconomic History    Marital status:    Tobacco Use    Smoking status: Never Smoker    Smokeless tobacco: Never Used   Vaping Use    Vaping Use: Never used   Substance and Sexual Activity    Alcohol use: Not Currently    Drug use: Never        Current Facility-Administered Medications   Medication Dose Route Frequency    [START ON 12/15/2021] losartan/hydroCHLOROthiazide (HYZAAR) 100/12.5 mg   Oral DAILY    [START ON 12/15/2021] pantoprazole (PROTONIX) tablet 40 mg  40 mg Oral ACB    lactated Ringers infusion  100 mL/hr IntraVENous CONTINUOUS    sodium chloride (NS) flush 5-40 mL  5-40 mL IntraVENous Q8H    sodium chloride (NS) flush 5-40 mL  5-40 mL IntraVENous PRN    albuterol-ipratropium (DUO-NEB) 2.5 MG-0.5 MG/3 ML  3 mL Nebulization Q6H PRN    fluticasone propionate (FLONASE) 50 mcg/actuation nasal spray 2 Spray  2 Spray Both Nostrils DAILY  sodium chloride (NS) flush 5-40 mL  5-40 mL IntraVENous Q8H    sodium chloride (NS) flush 5-40 mL  5-40 mL IntraVENous PRN    piperacillin-tazobactam (ZOSYN) 3.375 g in 0.9% sodium chloride (MBP/ADV) 100 mL MBP  3.375 g IntraVENous Q8H    0.9% sodium chloride infusion  75 mL/hr IntraVENous CONTINUOUS    influenza vaccine  (6 mos+)(PF) (FLUARIX/FLULAVAL/FLUZONE QUAD) injection 0.5 mL  1 Each IntraMUSCular PRIOR TO DISCHARGE    amLODIPine (NORVASC) tablet 10 mg  10 mg Oral DAILY    atorvastatin (LIPITOR) tablet 20 mg  20 mg Oral DAILY        Allergies   Allergen Reactions    Adhesive Rash    Aspirin Palpitations    Benadrilina [Guaifenesin] Other (comments)     Makes me climb the hobson    Iodine Rash    Niacin Rash    Penicillins Palpitations       Review of Systems:  Pertinent items are noted in the History of Present Illness.     Objective:        Patient Vitals for the past 8 hrs:   BP Temp Pulse Resp SpO2   21 1627 (!) 163/59 98 °F (36.7 °C) (!) 52 18 97 %   21 1159 135/65 97.3 °F (36.3 °C) 73 16 97 %       Temp (24hrs), Av.8 °F (36.6 °C), Min:97.3 °F (36.3 °C), Max:98.2 °F (36.8 °C)      Physical Exam:  GENERAL: alert, cooperative, no distress, appears stated age, LUNG: clear to auscultation bilaterally, normal effort, no stridor, HEART: regular, ABDOMEN: soft, NEUROLOGIC: negative findings: alert, oriented x3  speech normal in context and clarity  memory intact grossly  cranial nerves II-XII intact, PSYCHIATRIC: non focal      Labs:   Recent Results (from the past 24 hour(s))   METABOLIC PANEL, BASIC    Collection Time: 21  6:37 PM   Result Value Ref Range    Sodium 140 136 - 145 mmol/L    Potassium 4.4 3.5 - 5.1 mmol/L    Chloride 108 (H) 98 - 107 mmol/L    CO2 27 21 - 32 mmol/L    Anion gap 5 (L) 7 - 16 mmol/L    Glucose 88 65 - 100 mg/dL    BUN 18 8 - 23 MG/DL    Creatinine 0.93 0.6 - 1.0 MG/DL    GFR est AA >60 >60 ml/min/1.73m2    GFR est non-AA 60 (L) >60 ml/min/1.73m2    Calcium 9.9 8.3 - 10.4 MG/DL   CBC W/O DIFF    Collection Time: 12/13/21  6:37 PM   Result Value Ref Range    WBC 8.9 4.3 - 11.1 K/uL    RBC 4.02 (L) 4.05 - 5.2 M/uL    HGB 12.3 11.7 - 15.4 g/dL    HCT 37.1 35.8 - 46.3 %    MCV 92.3 79.6 - 97.8 FL    MCH 30.6 26.1 - 32.9 PG    MCHC 33.2 31.4 - 35.0 g/dL    RDW 12.8 11.9 - 14.6 %    PLATELET 012 227 - 694 K/uL    MPV 10.0 9.4 - 12.3 FL    ABSOLUTE NRBC 0.00 0.0 - 0.2 K/uL       Data Review   CT Results (most recent):  Results from Hospital Encounter encounter on 12/13/21    CT NECK CHEST WO CONT    Narrative  CT NECK and CHEST WITHOUT CONTRAST. INDICATION:  Tear noted just distal to the cricopharyngeus at the proximal  esophageal web. TECHNIQUE:  3.75 mm axial scans from the obits to the aortic arch. 5 mm axial  scans from the apices through the diaphragms without contrast.  Radiation dose  reduction techniques were used for this study. Our CT scanners use one or more  of the following:  Automated exposure control, adjustment of the mA and or kV  according to patient size, iterative reconstruction. COMPARISON:  Chest CT from    FINDINGS:  NECK:  Intracranial contents: Grossly unremarkable. Globes and orbits: Symmetric and unremarkable. Paranasal sinuses: Clear. Mastoid air cells: Also clear. Parotid gland: Uniform attenuation. Submandibular glands: Bilaterally small. Nasopharynx: Unremarkable. Oral cavity: No mass identified. Larynx: Symmetric. Thyroid: Uniform and normal size. Lymph nodes: No cervical adenopathy. Bones: Moderate degenerative change throughout. Vascular: There are \"kissing\" carotids with a medial course. CHEST:  -Esophagus: There is gas within the esophagus and linear vertical striated  densities. This extends from the thoracic inlet to the level of the aortic arch. No gas in the mediastinum. No gas tracking upward into the strap muscles of the  neck.    -Lungs: No infiltrates, masses, or effusions.  No pneumothorax.  -Pleura: No pleural effusion. No pneumothorax. -Mediastinum/Axilla: No significant adenopathy.  -Heart/Vessels: Coronary and aortic calcifications. Mitral valvuloplasty.  -Chest Wall/Bones: No gross bony abnormality. Impression  In the esophagus, from the thoracic inlet to the level of the aortic  arch, there are thin intraluminal densities as above. This could represent  material such as food or blood (clot) within the esophagus or possibly  submucosal gas from an ulcer or tear. No pneumomediastinum. No pneumothorax. No  abnormal fluid collections. No pleural effusion. 689       Assessment:     Patient Active Problem List   Diagnosis Code    Esophageal tear S11.21XA        Plan:     No evidence of sepsis/mediastinitis  Tolerating clear liquids without pain  Recommend another 24hrs clear, advance to fulls. abx likely not beneficial in absence of perforation  Would not re-attempt dilation for 4 weeks.   Will be available    Signed By: Norma James MD     December 14, 2021

## 2021-12-15 LAB
ALBUMIN SERPL-MCNC: 3.1 G/DL (ref 3.2–4.6)
ALBUMIN/GLOB SERPL: 0.8 {RATIO} (ref 1.2–3.5)
ALP SERPL-CCNC: 79 U/L (ref 50–136)
ALT SERPL-CCNC: 20 U/L (ref 12–65)
ANION GAP SERPL CALC-SCNC: 6 MMOL/L (ref 7–16)
AST SERPL-CCNC: 18 U/L (ref 15–37)
BASOPHILS # BLD: 0 K/UL (ref 0–0.2)
BASOPHILS NFR BLD: 1 % (ref 0–2)
BILIRUB SERPL-MCNC: 0.6 MG/DL (ref 0.2–1.1)
BUN SERPL-MCNC: 13 MG/DL (ref 8–23)
CALCIUM SERPL-MCNC: 8.9 MG/DL (ref 8.3–10.4)
CHLORIDE SERPL-SCNC: 114 MMOL/L (ref 98–107)
CO2 SERPL-SCNC: 24 MMOL/L (ref 21–32)
CREAT SERPL-MCNC: 0.75 MG/DL (ref 0.6–1)
DIFFERENTIAL METHOD BLD: ABNORMAL
EOSINOPHIL # BLD: 0.3 K/UL (ref 0–0.8)
EOSINOPHIL NFR BLD: 4 % (ref 0.5–7.8)
ERYTHROCYTE [DISTWIDTH] IN BLOOD BY AUTOMATED COUNT: 12.8 % (ref 11.9–14.6)
GLOBULIN SER CALC-MCNC: 3.7 G/DL (ref 2.3–3.5)
GLUCOSE SERPL-MCNC: 89 MG/DL (ref 65–100)
HCT VFR BLD AUTO: 34 % (ref 35.8–46.3)
HGB BLD-MCNC: 11.2 G/DL (ref 11.7–15.4)
IMM GRANULOCYTES # BLD AUTO: 0 K/UL (ref 0–0.5)
IMM GRANULOCYTES NFR BLD AUTO: 0 % (ref 0–5)
LYMPHOCYTES # BLD: 1.4 K/UL (ref 0.5–4.6)
LYMPHOCYTES NFR BLD: 22 % (ref 13–44)
MCH RBC QN AUTO: 30.7 PG (ref 26.1–32.9)
MCHC RBC AUTO-ENTMCNC: 32.9 G/DL (ref 31.4–35)
MCV RBC AUTO: 93.2 FL (ref 79.6–97.8)
MONOCYTES # BLD: 0.5 K/UL (ref 0.1–1.3)
MONOCYTES NFR BLD: 8 % (ref 4–12)
NEUTS SEG # BLD: 4.1 K/UL (ref 1.7–8.2)
NEUTS SEG NFR BLD: 64 % (ref 43–78)
NRBC # BLD: 0 K/UL (ref 0–0.2)
PLATELET # BLD AUTO: 183 K/UL (ref 150–450)
PMV BLD AUTO: 9.9 FL (ref 9.4–12.3)
POTASSIUM SERPL-SCNC: 3.8 MMOL/L (ref 3.5–5.1)
PROT SERPL-MCNC: 6.8 G/DL (ref 6.3–8.2)
RBC # BLD AUTO: 3.65 M/UL (ref 4.05–5.2)
SODIUM SERPL-SCNC: 144 MMOL/L (ref 136–145)
WBC # BLD AUTO: 6.4 K/UL (ref 4.3–11.1)

## 2021-12-15 PROCEDURE — G0378 HOSPITAL OBSERVATION PER HR: HCPCS

## 2021-12-15 PROCEDURE — 74011250637 HC RX REV CODE- 250/637: Performed by: INTERNAL MEDICINE

## 2021-12-15 PROCEDURE — 85025 COMPLETE CBC W/AUTO DIFF WBC: CPT

## 2021-12-15 PROCEDURE — 96376 TX/PRO/DX INJ SAME DRUG ADON: CPT

## 2021-12-15 PROCEDURE — 80053 COMPREHEN METABOLIC PANEL: CPT

## 2021-12-15 PROCEDURE — 74011000258 HC RX REV CODE- 258: Performed by: INTERNAL MEDICINE

## 2021-12-15 PROCEDURE — 36415 COLL VENOUS BLD VENIPUNCTURE: CPT

## 2021-12-15 PROCEDURE — 74011250636 HC RX REV CODE- 250/636: Performed by: INTERNAL MEDICINE

## 2021-12-15 RX ORDER — ACETAMINOPHEN 500 MG
500 TABLET ORAL
Status: DISCONTINUED | OUTPATIENT
Start: 2021-12-15 | End: 2021-12-16 | Stop reason: HOSPADM

## 2021-12-15 RX ADMIN — Medication 10 ML: at 05:08

## 2021-12-15 RX ADMIN — HYDROCHLOROTHIAZIDE: 12.5 CAPSULE ORAL at 08:38

## 2021-12-15 RX ADMIN — PIPERACILLIN SODIUM AND TAZOBACTAM SODIUM 3.38 G: 3; .375 INJECTION, POWDER, LYOPHILIZED, FOR SOLUTION INTRAVENOUS at 01:00

## 2021-12-15 RX ADMIN — FLUTICASONE PROPIONATE 2 SPRAY: 50 SPRAY, METERED NASAL at 08:39

## 2021-12-15 RX ADMIN — PIPERACILLIN SODIUM AND TAZOBACTAM SODIUM 3.38 G: 3; .375 INJECTION, POWDER, LYOPHILIZED, FOR SOLUTION INTRAVENOUS at 09:50

## 2021-12-15 RX ADMIN — Medication 10 ML: at 14:39

## 2021-12-15 RX ADMIN — Medication 10 ML: at 20:27

## 2021-12-15 RX ADMIN — Medication 10 ML: at 14:38

## 2021-12-15 RX ADMIN — ATORVASTATIN CALCIUM 20 MG: 20 TABLET, FILM COATED ORAL at 08:38

## 2021-12-15 RX ADMIN — PIPERACILLIN SODIUM AND TAZOBACTAM SODIUM 3.38 G: 3; .375 INJECTION, POWDER, LYOPHILIZED, FOR SOLUTION INTRAVENOUS at 17:12

## 2021-12-15 RX ADMIN — AMLODIPINE BESYLATE 10 MG: 10 TABLET ORAL at 08:38

## 2021-12-15 RX ADMIN — PANTOPRAZOLE SODIUM 40 MG: 40 TABLET, DELAYED RELEASE ORAL at 05:08

## 2021-12-15 NOTE — PROGRESS NOTES
A/O.  OOB standby assist.  IVF infusing. Remains on cl liq. No c/o. Discomfort. Hourly rounds in progress. Resting in bed. Denies needs or pain at this time. Bed in low locked position. Call light and personal items within reach. Will continue to monitor and give report to oncoming day shift nurse.

## 2021-12-15 NOTE — PROGRESS NOTES
[facility] Progress Note         Admit Date:  12/13/2021    Today's Date:  12/15/2021    CC:  Esophageal Tear    Subjective:     Patient denies any chest pain, nausea, or vomiting. She is tolerating clear liquids. Denies any BM since admission. EGD 12/13/2021 with Dr. Brandon Preston:  IMPRESSION:  1.  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 2.  Tear noted as the endoscope was passed through the cricopharyngeus to the web.  Procedure terminated.     PLAN:  Diagnostics:  1.  CT of the neck and chest for completeness, although the patient has no complaints at this time. 2.  Advance diet slowly if the CT scan shows no obvious leak.   3.  Repeat EGD in the future with pediatric upper scope using a Savary guidewire and fluoroscopy after Savary guidewire placement.        DANIEL CAGE MD       Medications:   Current Facility-Administered Medications   Medication Dose Route Frequency    losartan/hydroCHLOROthiazide (HYZAAR) 100/12.5 mg   Oral DAILY    pantoprazole (PROTONIX) tablet 40 mg  40 mg Oral ACB    lactated Ringers infusion  100 mL/hr IntraVENous CONTINUOUS    sodium chloride (NS) flush 5-40 mL  5-40 mL IntraVENous Q8H    sodium chloride (NS) flush 5-40 mL  5-40 mL IntraVENous PRN    albuterol-ipratropium (DUO-NEB) 2.5 MG-0.5 MG/3 ML  3 mL Nebulization Q6H PRN    fluticasone propionate (FLONASE) 50 mcg/actuation nasal spray 2 Spray  2 Spray Both Nostrils DAILY    sodium chloride (NS) flush 5-40 mL  5-40 mL IntraVENous Q8H    sodium chloride (NS) flush 5-40 mL  5-40 mL IntraVENous PRN    piperacillin-tazobactam (ZOSYN) 3.375 g in 0.9% sodium chloride (MBP/ADV) 100 mL MBP  3.375 g IntraVENous Q8H    0.9% sodium chloride infusion  75 mL/hr IntraVENous CONTINUOUS    influenza vaccine 2021-22 (6 mos+)(PF) (FLUARIX/FLULAVAL/FLUZONE QUAD) injection 0.5 mL  1 Each IntraMUSCular PRIOR TO DISCHARGE    amLODIPine (NORVASC) tablet 10 mg  10 mg Oral DAILY    atorvastatin (LIPITOR) tablet 20 mg  20 mg Oral DAILY       Review of Systems:  ROS was obtained, with pertinent positives as listed above. No chest pain or SOB. Diet:  Clear liquid diet    Objective:   Vitals:  Visit Vitals  BP (!) 171/60 (BP 1 Location: Left upper arm, BP Patient Position: At rest)   Pulse 68   Temp 98.3 °F (36.8 °C)   Resp 16   Ht 4' 11.5\" (1.511 m)   Wt 60.8 kg (134 lb)   SpO2 99%   BMI 26.61 kg/m²     Intake/Output:  12/15 0701 - 12/15 1900  In: 500 [P.O.:500]  Out: -   1901 - 12/15 0700  In: 200 [P.O.:200]  Out: -   Exam:  General appearance: alert, cooperative, no distress  Lungs: clear to auscultation bilaterally anteriorly  Heart: regular rate and rhythm  Abdomen: soft, non-tender. Bowel sounds normal. No masses, no organomegaly  Extremities: extremities normal, atraumatic, no cyanosis or edema  Neuro:  alert and oriented    Data Review (Labs):    Recent Labs     12/15/21  0620 21  1837   WBC 6.4 8.9   HGB 11.2* 12.3   HCT 34.0* 37.1    206   MCV 93.2 92.3    140   K 3.8 4.4   * 108*   CO2 24 27   BUN 13 18   CREA 0.75 0.93   CA 8.9 9.9   GLU 89 88   AP 79  --    AST 18  --    ALT 20  --    TBILI 0.6  --    ALB 3.1*  --    TP 6.8  --        Assessment:     Principal Problem:    Esophageal tear (2021)    81 yo female who presented today for outpatient EGD for dysphagia. No dilation was performed due to esophageal tear seen on EGD. CT scan was performed with findings of proximal Esophageal submucosal air seen.           Plan:       Clear liquid diet today  Appreciate surgical recommendations          Karina Ernst NP    Patient is seen and examined in collaboration with Dr. Francisca Richard. .  Assessment and plan as per Dr. Francisca Richard. GI--Patient seen and examined--doing well. No pain or dyspnea. Afebrile; no leukocytosis. Will continue current management--hopefully home soon.   Kaleigh Lr MD

## 2021-12-16 VITALS
RESPIRATION RATE: 16 BRPM | BODY MASS INDEX: 26.31 KG/M2 | TEMPERATURE: 98 F | HEIGHT: 60 IN | HEART RATE: 66 BPM | SYSTOLIC BLOOD PRESSURE: 147 MMHG | DIASTOLIC BLOOD PRESSURE: 79 MMHG | WEIGHT: 134 LBS | OXYGEN SATURATION: 97 %

## 2021-12-16 PROCEDURE — 96376 TX/PRO/DX INJ SAME DRUG ADON: CPT

## 2021-12-16 PROCEDURE — 90471 IMMUNIZATION ADMIN: CPT

## 2021-12-16 PROCEDURE — 74011250637 HC RX REV CODE- 250/637: Performed by: INTERNAL MEDICINE

## 2021-12-16 PROCEDURE — 74011250636 HC RX REV CODE- 250/636: Performed by: INTERNAL MEDICINE

## 2021-12-16 PROCEDURE — 90686 IIV4 VACC NO PRSV 0.5 ML IM: CPT | Performed by: INTERNAL MEDICINE

## 2021-12-16 PROCEDURE — 74011000258 HC RX REV CODE- 258: Performed by: INTERNAL MEDICINE

## 2021-12-16 PROCEDURE — G0378 HOSPITAL OBSERVATION PER HR: HCPCS

## 2021-12-16 PROCEDURE — 65270000029 HC RM PRIVATE

## 2021-12-16 RX ORDER — AMOXICILLIN AND CLAVULANATE POTASSIUM 875; 125 MG/1; MG/1
1 TABLET, FILM COATED ORAL EVERY 12 HOURS
Status: DISCONTINUED | OUTPATIENT
Start: 2021-12-16 | End: 2021-12-16 | Stop reason: HOSPADM

## 2021-12-16 RX ORDER — AMOXICILLIN AND CLAVULANATE POTASSIUM 875; 125 MG/1; MG/1
1 TABLET, FILM COATED ORAL EVERY 12 HOURS
Qty: 14 TABLET | Refills: 0 | Status: SHIPPED | OUTPATIENT
Start: 2021-12-16 | End: 2021-12-23

## 2021-12-16 RX ADMIN — ACETAMINOPHEN 500 MG: 500 TABLET ORAL at 00:00

## 2021-12-16 RX ADMIN — AMLODIPINE BESYLATE 10 MG: 10 TABLET ORAL at 09:21

## 2021-12-16 RX ADMIN — HYDROCHLOROTHIAZIDE: 12.5 CAPSULE ORAL at 09:21

## 2021-12-16 RX ADMIN — Medication 10 ML: at 05:02

## 2021-12-16 RX ADMIN — AMOXICILLIN AND CLAVULANATE POTASSIUM 1 TABLET: 875; 125 TABLET, FILM COATED ORAL at 09:21

## 2021-12-16 RX ADMIN — PANTOPRAZOLE SODIUM 40 MG: 40 TABLET, DELAYED RELEASE ORAL at 05:02

## 2021-12-16 RX ADMIN — ATORVASTATIN CALCIUM 20 MG: 20 TABLET, FILM COATED ORAL at 09:20

## 2021-12-16 RX ADMIN — INFLUENZA VIRUS VACCINE 0.5 ML: 15; 15; 15; 15 SUSPENSION INTRAMUSCULAR at 10:53

## 2021-12-16 RX ADMIN — PIPERACILLIN SODIUM AND TAZOBACTAM SODIUM 3.38 G: 3; .375 INJECTION, POWDER, LYOPHILIZED, FOR SOLUTION INTRAVENOUS at 01:01

## 2021-12-16 RX ADMIN — Medication 10 ML: at 05:01

## 2021-12-16 NOTE — PROGRESS NOTES
Up ad yo, ambulated in restrepo. IVF continue. Remains on clear liq. Tylenol x 1 HS. Rested well. Hourly rounds in progress. Resting in bed. Denies needs or pain at this time. Bed in low locked position. Call light and personal items within reach. Will continue to monitor and give report to oncoming day shift nurse.

## 2021-12-16 NOTE — DISCHARGE SUMMARY
[facility] Discharge Summary      Patient ID:  Antoinette Becker  [id number]  1 y.o.  11/2/1927    Admit date:  12/13/2021    Discharge date and time:  12/16/2021       Admitting Physician:  Tacho Miranda MD     Discharge Physician: Dr. 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 General Surgery was consulted and recommended clear liquid diet for a total of 72 hours and then full liquid diet and then usual diet. She was afebrile during admission and WBC was normal. She was treated with IV Zosyn and then transitioned to Oral Augmentin. She has tolerated a clear liquid diet during admission. Today, she will follow a full liquid diet and is stable. She will be discharged home on full liquid diet and then resume her usual soft diet tomorrow. Outpatient follow-up in 3-4 weeks. She was up ambulating independently in the room today. EGD 12/13/2021 with Dr. Kellogg Number:  IMPRESSION:  1. 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 2.  Tear noted as the endoscope was passed through the cricopharyngeus to the web. Procedure terminated. PLAN:  Diagnostics:  1.  CT of the neck and chest for completeness, although the patient has no complaints at this time. 2.  Advance diet slowly if the CT scan shows no obvious leak.   3.  Repeat EGD in the future with pediatric upper scope using a Savary guidewire and fluoroscopy after Savary guidewire placement. Vineet Mishra MD  --------------------------------------------    CT Neck/Chest without contrast 12/13/2021  FINDINGS:    NECK:  Intracranial contents: Grossly unremarkable. Globes and orbits: Symmetric and unremarkable. Paranasal sinuses: Clear. Mastoid air cells: Also clear. Parotid gland: Uniform attenuation. Submandibular glands: Bilaterally small. Nasopharynx: Unremarkable. Oral cavity: No mass identified. Larynx: Symmetric. Thyroid: Uniform and normal size. Lymph nodes: No cervical adenopathy. Bones: Moderate degenerative change throughout. Vascular: There are \"kissing\" carotids with a medial course. CHEST:  -Esophagus: There is gas within the esophagus and linear vertical striated  densities. This extends from the thoracic inlet to the level of the aortic arch. No gas in the mediastinum. No gas tracking upward into the strap muscles of the  neck.      -Lungs: No infiltrates, masses, or effusions. No pneumothorax.  -Pleura: No pleural effusion. No pneumothorax. -Mediastinum/Axilla: No significant adenopathy.  -Heart/Vessels: Coronary and aortic calcifications. Mitral valvuloplasty.  -Chest Wall/Bones: No gross bony abnormality. IMPRESSION  In the esophagus, from the thoracic inlet to the level of the aortic  arch, there are thin intraluminal densities as above. This could represent  material such as food or blood (clot) within the esophagus or possibly  submucosal gas from an ulcer or tear. No pneumomediastinum. No pneumothorax. No  abnormal fluid collections. No pleural effusion.   689       Objective:   Vitals:  Visit Vitals  BP (!) 147/79 (BP 1 Location: Left upper arm, BP Patient Position: At rest)   Pulse 66   Temp 98 °F (36.7 °C)   Resp 16   Ht 4' 11.5\" (1.511 m)   Wt 60.8 kg (134 lb)   SpO2 97%   BMI 26.61 kg/m²     Intake/Output:  No intake/output data recorded. 12/14 1901 - 12/16 0700  In: 12 [P.O.:950]  Out: -   Exam:  General appearance: alert, cooperative, no distress  Lungs: clear to auscultation bilaterally anteriorly  Heart: regular rate and rhythm  Abdomen: soft, non-tender. Bowel sounds normal. No masses, no organomegaly  Extremities: extremities normal, atraumatic, no cyanosis or edema  Neuro:  alert and oriented    Disposition:  Stable    Diet:  Full liquid diet    Activity:  As tolerated    Patient Instructions:   Current Discharge Medication List        START taking these medications    Details   amoxicillin-clavulanate (AUGMENTIN) 875-125 mg per tablet Take 1 Tablet by mouth every twelve (12) hours for 7 days. Qty: 14 Tablet, Refills: 0           CONTINUE these medications which have NOT CHANGED    Details   amLODIPine-atorvastatin (CADUET) 10-10 mg per tablet Take 1 Tablet by mouth daily. atorvastatin (LIPITOR) 20 mg tablet Take 20 mg by mouth daily. Calcium-Cholecalciferol, D3, (Calcium 600 with Vitamin D3) 600 mg(1,500mg) -400 unit chew Take 500 mg by mouth daily. losartan-hydroCHLOROthiazide (HYZAAR) 100-12.5 mg per tablet Take 1 Tablet by mouth daily. Esomeprazole Magnesium (NexIUM Packet) Take 20 mg by mouth daily. vitamin O-N-T-lutein-minerals (OCUVITE) tablet Take 2 Tablets by mouth daily. cholecalciferol (Vitamin D3) 25 mcg (1,000 unit) cap Take 1,000 Units by mouth daily. acetaminophen (TylenoL) 325 mg tablet Take 650 mg by mouth every four (4) hours as needed for Pain. albuterol (ProAir HFA) 90 mcg/actuation inhaler Take 2 Puffs by inhalation. loratadine-pseudoephedrine (Claritin-D 24 Hour)  mg per tablet Take 1 Tablet by mouth daily. traMADoL (ULTRAM) 50 mg tablet Take 1 Tablet by mouth three (3) times daily for 30 days.  Max Daily Amount: 150 mg.  Qty: 45 Tablet, Refills: 1    Associated Diagnoses: Spondylolisthesis of lumbar region      fluticasone (FLONASE) 50 mcg/actuation nasal spray 2 Sprays by Both Nostrils route daily. 1 spray in each nostril. Qty: 1 Bottle, Refills: 0             Follow up: Follow-up Appointments   Procedures    FOLLOW UP VISIT Appointment in: Two Weeks Our office will call patient to make follow-up. Our office will call patient to make follow-up. Standing Status:   Standing     Number of Occurrences:   1     Order Specific Question:   Appointment in     Answer: Two Weeks       Total time discharging patient took greater than 30 minutes. Signed:  Pato Das NP  12/16/2021  10:22 AM      GI--agree with above.

## 2021-12-16 NOTE — PROGRESS NOTES
Patient is medically stable for discharge. CM met with patient to discuss discharge needs. Patient denies any discharge/supportive care needs. Patient to return home. Vazquez Dhillon [phone] to transport patient home. CM remains available. Care Management Interventions  PCP Verified by CM: Yes  Mode of Transport at Discharge: Other (see comment) (Jim Dhillon [phone])  Transition of Care Consult (CM Consult): Discharge Planning  Discharge Durable Medical Equipment: No  Physical Therapy Consult: No  Occupational Therapy Consult: No  Speech Therapy Consult: No  Support Systems: Child(katherine)  Confirm Follow Up Transport: Family  The Plan for Transition of Care is Related to the Following Treatment Goals : Return to baseline. The Patient and/or Patient Representative was Provided with a Choice of Provider and Agrees with the Discharge Plan?: Yes  Name of the Patient Representative Who was Provided with a Choice of Provider and Agrees with the Discharge Plan: Patient/ Patient's Jim Dhillon [phone].    Resource Information Provided?: No  Discharge Location  Discharge Placement: Home

## 2021-12-16 NOTE — PROGRESS NOTES
Discharge instructions reviewed with Pt. PIV removed. R discussed and Pt verbalized all understanding. Brother to transport Pt home and is on his way. All questions answered at this time.    Flu vaccine administered per Pt request.

## 2021-12-16 NOTE — PROGRESS NOTES
[facility] Progress Note         Admit Date:  12/13/2021    Today's Date:  12/16/2021    CC:  Esophageal Tear    Subjective:     Patient tolerating a clear liquid diet. Denies any abdominal pain, chest pain, nausea, or vomiting. Wants to go home. Eats a soft diet at home. EGD 12/13/2021 with Dr. Juan Sorensen:  IMPRESSION:  1. 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 2.  Tear noted as the endoscope was passed through the cricopharyngeus to the web. Procedure terminated. PLAN:  Diagnostics:  1.  CT of the neck and chest for completeness, although the patient has no complaints at this time. 2.  Advance diet slowly if the CT scan shows no obvious leak. 3.  Repeat EGD in the future with pediatric upper scope using a Savary guidewire and fluoroscopy after Savary guidewire placement.         DANIEL CAGE MD       Medications:   Current Facility-Administered Medications   Medication Dose Route Frequency    amoxicillin-clavulanate (AUGMENTIN) 875-125 mg per tablet 1 Tablet  1 Tablet Oral Q12H    acetaminophen (TYLENOL) tablet 500 mg  500 mg Oral Q6H PRN    losartan/hydroCHLOROthiazide (HYZAAR) 100/12.5 mg   Oral DAILY    pantoprazole (PROTONIX) tablet 40 mg  40 mg Oral ACB    lactated Ringers infusion  100 mL/hr IntraVENous CONTINUOUS    sodium chloride (NS) flush 5-40 mL  5-40 mL IntraVENous Q8H    sodium chloride (NS) flush 5-40 mL  5-40 mL IntraVENous PRN    albuterol-ipratropium (DUO-NEB) 2.5 MG-0.5 MG/3 ML  3 mL Nebulization Q6H PRN    fluticasone propionate (FLONASE) 50 mcg/actuation nasal spray 2 Spray  2 Spray Both Nostrils DAILY    sodium chloride (NS) flush 5-40 mL  5-40 mL IntraVENous Q8H    sodium chloride (NS) flush 5-40 mL  5-40 mL IntraVENous PRN    0.9% sodium chloride infusion  75 mL/hr IntraVENous CONTINUOUS    influenza vaccine 2021-22 (6 mos+)(PF) (FLUARIX/FLULAVAL/FLUZONE QUAD) injection 0.5 mL  1 Each IntraMUSCular PRIOR TO DISCHARGE    amLODIPine (NORVASC) tablet 10 mg  10 mg Oral DAILY    atorvastatin (LIPITOR) tablet 20 mg  20 mg Oral DAILY       Review of Systems:  ROS was obtained, with pertinent positives as listed above. No chest pain or SOB. Diet:  Clear liquid    Objective:   Vitals:  Visit Vitals  BP (!) 147/79 (BP 1 Location: Left upper arm, BP Patient Position: At rest)   Pulse 66   Temp 98 °F (36.7 °C)   Resp 16   Ht 4' 11.5\" (1.511 m)   Wt 60.8 kg (134 lb)   SpO2 97%   BMI 26.61 kg/m²     Intake/Output:  No intake/output data recorded. 12/14 1901 - 12/16 0700  In: 12 [P.O.:950]  Out: -   Exam:  General appearance: alert, cooperative, no distress  Lungs: clear to auscultation bilaterally anteriorly  Heart: regular rate and rhythm  Abdomen: soft, non-tender. Bowel sounds normal. No masses, no organomegaly  Extremities: extremities normal, atraumatic, no cyanosis or edema  Neuro:  alert and oriented    Data Review (Labs):    Recent Labs     12/15/21  0620 12/13/21  1837   WBC 6.4 8.9   HGB 11.2* 12.3   HCT 34.0* 37.1    206   MCV 93.2 92.3    140   K 3.8 4.4   * 108*   CO2 24 27   BUN 13 18   CREA 0.75 0.93   CA 8.9 9.9   GLU 89 88   AP 79  --    AST 18  --    ALT 20  --    TBILI 0.6  --    ALB 3.1*  --    TP 6.8  --        Assessment:     Principal Problem:    Esophageal tear (12/13/2021)      81 yo female who presented today for outpatient EGD for dysphagia. No dilation was performed due to esophageal tear seen on EGD. CT scan was performed with findings of proximal Esophageal submucosal air seen. Plan:     Advance to full liquids today  Change to oral antibiotics  Will discharge home on a liquid diet today. Start back on her regular diet tomorrow. Discharge on Augmentin BID for 7 days which will be a total of 10 days of antibiotics    Faviola Celeste NP    Patient is seen and examined in collaboration with Dr. Venkatesh Schafer. .  Assessment and plan as per Dr. Venkatesh Schafer. GI--Agree with above.  Will see back in the office for a follow up visit.   Tacho Miranda MD

## 2021-12-16 NOTE — DISCHARGE INSTRUCTIONS
Gastrointestinal Esophagogastroduodenoscopy (EGD) - Upper Exam Discharge Instructions    1. Call Dr. Rayshawn Berman office at [phone] for any problems or questions. 2. Contact the doctor's office for follow up appointment as directed. 3. Medication may cause drowsiness for several hours, therefore, do not drive or operate machinery for remainder of the day. 4. No alcohol today. 5. Ordinarily, you may resume regular diet and activity after exam unless otherwise specified by your physician. 6. For mild soreness in your throat you may use Cepacol throat lozenges or warm salt-water gargles as needed. Any additional instructions:    1.  Office will call to follow up with future EGD procedure

## 2021-12-29 NOTE — PROGRESS NOTES
Physician Progress Note      Jenny Ruelas  CSN #:                  [id number]  :                       1927  ADMIT DATE:       2021 7:48 AM  DISCH DATE:        2021 12:10 PM  RESPONDING  PROVIDER #:        Fili GOOD          QUERY TEXT:    Patient admitted with esophageal tear from outpatient endoscopy. Noted documentation of Esophageal perforation by Surgery consult on  and  abx likely not beneficial in absence of perforation  in the same note, on  by Surgical consult: If possible, please document in progress notes and discharge summary if you are evaluating and /or treating any of the following: The medical record reflects the following:  Risk Factors: EGD procedure  Clinical Indicators: EGD 2021 with Dr. Raheel Murray noted as the endoscope was passed through the cricopharyngeus to the web. Procedure terminated. Treatment: NPO, IVF, Zosyn IV      Thank you. Renetta Johnson RN CDI, CCS  My office phone [phone]  Options provided:  -- Esophageal perforation  confirmed and  absence of perforation   ruled out  -- absence of perforation   confirmed and Esophageal perforation  ruled out  -- Other - I will add my own diagnosis  -- Disagree - Not applicable / Not valid  -- Disagree - Clinically unable to determine / Unknown  -- Refer to Clinical Documentation Reviewer    PROVIDER RESPONSE TEXT:    After study, absence of perforation confirmed and Esophageal perforation ruled out.     Query created by: Chetna Pham on 2021 11:01 AM      Electronically signed by:  Fili Hendrickson NP [facility] 2021 11:37 AM

## 2022-03-19 PROBLEM — S11.21XA ESOPHAGEAL TEAR: Status: ACTIVE | Noted: 2021-12-13

## 2022-08-03 ENCOUNTER — TELEPHONE (OUTPATIENT)
Dept: ORTHOPEDIC SURGERY | Age: 87
End: 2022-08-03

## 2022-08-15 NOTE — PROGRESS NOTES
[facility]  Consultation Note  Virtual/Telephone Visit     Patient ID:  Name: Mundo Barbosa  MRN: [id number]  AGE: 80 y.o.  : 1927    Date of Consultation:  2022    CC:   Chief Complaint   Patient presents with    Back Pain           HPI:  Today's visit was performed through a telephone visit with live audio feed. The patient was at home in [place]. I was in the office. No other persons were present. Verbal informed consent was obtained prior to today's visit, which lasted 11 minutes and included evaluation and management of symptoms, review of imaging, review of previous treatments, and discussion of treatment options. Please see the note below for more detailed information regarding today's visit. Ms. Ray Morataya is a 80-year-old female who presents today for follow-up of low back pain. She gets relief from intermittent bilateral L4-5 transforaminal epidural steroid injections, most recently performed 2021. The pain is in the low back. It radiates to the bilateral hips. The pain is associate with bilateral lower extremity cramps. The pain is aggravated with activity. There are no alleviating factors. MRI lumbar spine was last performed . This showed significant central and right neuroforaminal narrowing at L4-5. She also has severe central stenosis L3-4 and significant bilateral neuroforaminal narrowing at L3-4. Past Medical History Includes:   Past Medical History:   Diagnosis Date    Chronic pain    ,   Past Surgical History:   Procedure Laterality Date    ORTHOPEDIC SURGERY      left arm    ORTHOPEDIC SURGERY      left hip     Family History: No family history on file.    Social History:   Social History     Tobacco Use    Smoking status: Never    Smokeless tobacco: Never   Substance Use Topics    Alcohol use: Not Currently       ALLERGIES:   Allergies   Allergen Reactions    Guaifenesin Other (See Comments)     Makes me climb the walls    Aspirin Palpitations    Iodine Rash    Niacin Rash    Penicillins Palpitations        Patient Medications    Current Outpatient Medications   Medication Sig    acetaminophen (TYLENOL) 325 MG tablet Take 650 mg by mouth every 4 hours as needed    albuterol sulfate  (90 Base) MCG/ACT inhaler Inhale 2 puffs into the lungs    amLODIPine-atorvastatatin (CADUET) 10-10 MG per tablet Take 1 tablet by mouth daily    atorvastatin (LIPITOR) 20 MG tablet Take 20 mg by mouth daily    Calcium Carb-Cholecalciferol 600-800 MG-UNIT CHEW Take 500 mg by mouth daily    vitamin D 25 MCG (1000 UT) CAPS Take 1,000 Units by mouth daily    esomeprazole Magnesium (NEXIUM) 20 MG PACK Take 20 mg by mouth daily    fluticasone (FLONASE) 50 MCG/ACT nasal spray 2 sprays by Nasal route daily    loratadine-pseudoephedrine (CLARITIN-D 24 HOUR)  MG per extended release tablet Take 1 tablet by mouth daily    losartan-hydroCHLOROthiazide (HYZAAR) 100-12.5 MG per tablet Take 1 tablet by mouth daily     No current facility-administered medications for this visit. ROS/Meds/PSH/PMH/FH/SH: I personally reviewed the patients standard intake form. No flowsheet data found. No results found for any visits on 08/19/22. Assessment and Plan:     ICD-10-CM    1. Spinal stenosis, lumbar region with neurogenic claudication  M48.062 Injection Authorization - Spine      2. Other intervertebral disc degeneration, lumbar region  M51.36 Injection Authorization - Spine      3.  Radiculopathy, lumbosacral region  M54.17 Injection Authorization - Spine          Orders Placed This Encounter   Procedures    Injection Authorization - Spine     Referral Priority:   Routine     Number of Visits Requested:   1          4   This is a chronic illness/condition with exacerbation and progression  Treatment at this time: Discussed Injection therapy at length today, including risks, complications and alternative treatment options. The patient wishes to proceed. The injection will be performed as bilateral L4-5 transforaminal epidural steroid injections. She does not want any prescription medication for pain at this time. Studies ordered today: none. She does not want a new MRI at this time, as she is quite claustrophobic. On this date 8/19/2022 I have spent 11 minutes reviewing previous notes, test results and face to face with the patient discussing the diagnosis and importance of compliance with the treatment plan as well as documenting on the day of the visit. Mary Venegas, was evaluated through a synchronous (real-time) audio-video encounter. The patient (or guardian if applicable) is aware that this is a billable service. Verbal consent to proceed has been obtained within the past 12 months. The visit was conducted pursuant to the emergency declaration under the [facility], [address] [place] [address] authority and the [employer] General Act. Patient identification was verified, and a caregiver was present when appropriate. The patient was located in a state where the provider was credentialed to provide care.        Palma Arias MD  8/19/2022,  3:27 PM

## 2022-08-19 ENCOUNTER — TELEMEDICINE (OUTPATIENT)
Dept: ORTHOPEDIC SURGERY | Age: 87
End: 2022-08-19
Payer: MEDICARE

## 2022-08-19 DIAGNOSIS — M48.062 SPINAL STENOSIS, LUMBAR REGION WITH NEUROGENIC CLAUDICATION: Primary | ICD-10-CM

## 2022-08-19 DIAGNOSIS — M54.17 RADICULOPATHY, LUMBOSACRAL REGION: ICD-10-CM

## 2022-08-19 DIAGNOSIS — M51.36 OTHER INTERVERTEBRAL DISC DEGENERATION, LUMBAR REGION: ICD-10-CM

## 2022-08-19 PROCEDURE — 99442 PR PHYS/QHP TELEPHONE EVALUATION 11-20 MIN: CPT | Performed by: PHYSICAL MEDICINE & REHABILITATION

## 2022-09-14 ENCOUNTER — OFFICE VISIT (OUTPATIENT)
Dept: ORTHOPEDIC SURGERY | Age: 87
End: 2022-09-14
Payer: MEDICARE

## 2022-09-14 DIAGNOSIS — M51.36 OTHER INTERVERTEBRAL DISC DEGENERATION, LUMBAR REGION: ICD-10-CM

## 2022-09-14 DIAGNOSIS — M48.062 SPINAL STENOSIS, LUMBAR REGION WITH NEUROGENIC CLAUDICATION: Primary | ICD-10-CM

## 2022-09-14 DIAGNOSIS — M54.2 CERVICAL PAIN (NECK): ICD-10-CM

## 2022-09-14 DIAGNOSIS — M54.17 RADICULOPATHY, LUMBOSACRAL REGION: ICD-10-CM

## 2022-09-14 PROCEDURE — 64483 NJX AA&/STRD TFRM EPI L/S 1: CPT | Performed by: PHYSICAL MEDICINE & REHABILITATION

## 2022-09-14 RX ORDER — TRIAMCINOLONE ACETONIDE 40 MG/ML
40 INJECTION, SUSPENSION INTRA-ARTICULAR; INTRAMUSCULAR ONCE
Status: COMPLETED | OUTPATIENT
Start: 2022-09-14 | End: 2022-09-14

## 2022-09-14 RX ADMIN — TRIAMCINOLONE ACETONIDE 40 MG: 40 INJECTION, SUSPENSION INTRA-ARTICULAR; INTRAMUSCULAR at 13:55

## 2022-09-14 NOTE — PROGRESS NOTES
Name: Kate Bullard  YOB: 1927  Gender: female  MRN: [id number]        Transforaminal TIFF Procedure Note    Procedure: Bilateral  L4-L5 transforaminal epidural steroid injections     Precautions: Kate Bullard denies prior sensitivity to steroid, local anesthetic, iodine, or shellfish. Consent:  Consent was obtained prior to the procedure. The procedure was discussed at length with Kate Bullard. She was given the opportunity to ask questions regarding the procedure and its associated risks. In addition to the potential risks associated with the procedure itself, the patient was informed both verbally and in writing of potential side effects of the use glucocorticoids. The patient appeared to comprehend the informed consent and desired to have the procedure performed, and informed consent was signed. She was placed in a prone position on the fluoroscopy table and the skin was prepped and draped in a routine sterile fashion. The areas to be injected were each anesthetized with 1 ml of 1% Lidocaine. A 22 gauge 3.5 inch spinal needle was carefully advanced under fluoroscopic guidance to the right L4-L5 transforaminal space  0.5 ml of 70% of Omnipaque was injected to confirm proper needle placement and absence of subdural or vascular flow Once proper placement was confirmed, 0.5ml of 0.25 marcaine and 20 mg of kenalog were injected through the spinal needle. The above procedure was then repeated at the Left  L4-L5 transforaminal space using 20 mg of kenalog. Fluoroscopic guidance was used intermittently over a 10-minute period to insure proper needle placement and her safety. A hard copy of the fluoroscopic image has been placed in her chart and is saved on the C-arm hard drive. She was monitored for 30 minutes after the procedure and discharged home in a stable fashion with a routine follow up. Procedural Diagnosis:     ICD-10-CM    1.  Spinal stenosis, lumbar region with neurogenic claudication  M48.062 FL NERVE BLOCK LUMBOSACRAL 1ST     triamcinolone acetonide (KENALOG-40) injection 40 mg      2. Other intervertebral disc degeneration, lumbar region  M51.36 FL NERVE BLOCK LUMBOSACRAL 1ST     triamcinolone acetonide (KENALOG-40) injection 40 mg      3. Radiculopathy, lumbosacral region  M54.17 FL NERVE BLOCK LUMBOSACRAL 1ST     triamcinolone acetonide (KENALOG-40) injection 40 mg      4. Cervical pain (neck)  M54.2 External Referral To Pain Medicine         She was previously referred for cervical injection therapy November 2021 and did not yet have an appointment.   We will send a new consultation request for that    Chacorta Gutierrez MD  09/14/22

## 2022-11-23 ENCOUNTER — APPOINTMENT (OUTPATIENT)
Dept: GENERAL RADIOLOGY | Age: 87
End: 2022-11-23
Payer: MEDICARE

## 2022-11-23 ENCOUNTER — HOSPITAL ENCOUNTER (OUTPATIENT)
Age: 87
Setting detail: OBSERVATION
Discharge: HOME | End: 2022-11-24
Attending: EMERGENCY MEDICINE | Admitting: INTERNAL MEDICINE
Payer: MEDICARE

## 2022-11-23 DIAGNOSIS — I16.0 HYPERTENSIVE URGENCY: ICD-10-CM

## 2022-11-23 DIAGNOSIS — I21.4 NSTEMI (NON-ST ELEVATED MYOCARDIAL INFARCTION) (HCC): Primary | ICD-10-CM

## 2022-11-23 DIAGNOSIS — R07.89 ATYPICAL CHEST PAIN: ICD-10-CM

## 2022-11-23 PROBLEM — R77.8 TROPONIN I ABOVE REFERENCE RANGE: Status: ACTIVE | Noted: 2022-11-23

## 2022-11-23 PROBLEM — R79.89 TROPONIN I ABOVE REFERENCE RANGE: Status: ACTIVE | Noted: 2022-11-23

## 2022-11-23 PROBLEM — M47.812 CERVICAL SPONDYLOSIS: Status: ACTIVE | Noted: 2022-10-24

## 2022-11-23 PROBLEM — E55.9 VITAMIN D DEFICIENCY: Status: ACTIVE | Noted: 2019-01-24

## 2022-11-23 LAB
ALBUMIN SERPL-MCNC: 4.5 G/DL (ref 3.2–4.6)
ALBUMIN/GLOB SERPL: 1.1 {RATIO} (ref 0.4–1.6)
ALP SERPL-CCNC: 96 U/L (ref 50–136)
ALT SERPL-CCNC: 22 U/L (ref 12–65)
ANION GAP SERPL CALC-SCNC: 8 MMOL/L (ref 2–11)
AST SERPL-CCNC: 21 U/L (ref 15–37)
BASOPHILS # BLD: 0.1 K/UL (ref 0–0.2)
BASOPHILS NFR BLD: 1 % (ref 0–2)
BILIRUB SERPL-MCNC: 0.5 MG/DL (ref 0.2–1.1)
BUN SERPL-MCNC: 17 MG/DL (ref 8–23)
CALCIUM SERPL-MCNC: 9.9 MG/DL (ref 8.3–10.4)
CHLORIDE SERPL-SCNC: 104 MMOL/L (ref 101–110)
CO2 SERPL-SCNC: 24 MMOL/L (ref 21–32)
CREAT SERPL-MCNC: 1.1 MG/DL (ref 0.6–1)
DIFFERENTIAL METHOD BLD: ABNORMAL
EKG ATRIAL RATE: 89 BPM
EKG DIAGNOSIS: NORMAL
EKG P AXIS: 74 DEGREES
EKG P-R INTERVAL: 196 MS
EKG Q-T INTERVAL: 356 MS
EKG QRS DURATION: 90 MS
EKG QTC CALCULATION (BAZETT): 433 MS
EKG R AXIS: 61 DEGREES
EKG T AXIS: 29 DEGREES
EKG VENTRICULAR RATE: 89 BPM
EOSINOPHIL # BLD: 0.3 K/UL (ref 0–0.8)
EOSINOPHIL NFR BLD: 3 % (ref 0.5–7.8)
ERYTHROCYTE [DISTWIDTH] IN BLOOD BY AUTOMATED COUNT: 13 % (ref 11.9–14.6)
GLOBULIN SER CALC-MCNC: 4.1 G/DL (ref 2.8–4.5)
GLUCOSE SERPL-MCNC: 104 MG/DL (ref 65–100)
HCT VFR BLD AUTO: 36 % (ref 35.8–46.3)
HGB BLD-MCNC: 12.3 G/DL (ref 11.7–15.4)
IMM GRANULOCYTES # BLD AUTO: 0 K/UL (ref 0–0.5)
IMM GRANULOCYTES NFR BLD AUTO: 0 % (ref 0–5)
LYMPHOCYTES # BLD: 1.6 K/UL (ref 0.5–4.6)
LYMPHOCYTES NFR BLD: 16 % (ref 13–44)
MCH RBC QN AUTO: 31.8 PG (ref 26.1–32.9)
MCHC RBC AUTO-ENTMCNC: 34.2 G/DL (ref 31.4–35)
MCV RBC AUTO: 93 FL (ref 82–102)
MONOCYTES # BLD: 0.6 K/UL (ref 0.1–1.3)
MONOCYTES NFR BLD: 6 % (ref 4–12)
NEUTS SEG # BLD: 7.1 K/UL (ref 1.7–8.2)
NEUTS SEG NFR BLD: 74 % (ref 43–78)
NRBC # BLD: 0 K/UL (ref 0–0.2)
PLATELET # BLD AUTO: 272 K/UL (ref 150–450)
PMV BLD AUTO: 9.6 FL (ref 9.4–12.3)
POTASSIUM SERPL-SCNC: 3.6 MMOL/L (ref 3.5–5.1)
PROT SERPL-MCNC: 8.6 G/DL (ref 6.3–8.2)
RBC # BLD AUTO: 3.87 M/UL (ref 4.05–5.2)
SODIUM SERPL-SCNC: 136 MMOL/L (ref 133–143)
TROPONIN I SERPL HS-MCNC: 126.5 PG/ML (ref 0–14)
TROPONIN I SERPL HS-MCNC: 137.4 PG/ML (ref 0–14)
TROPONIN I SERPL HS-MCNC: 138.7 PG/ML (ref 0–14)
TROPONIN I SERPL HS-MCNC: 154 PG/ML (ref 0–14)
WBC # BLD AUTO: 9.6 K/UL (ref 4.3–11.1)

## 2022-11-23 PROCEDURE — 80053 COMPREHEN METABOLIC PANEL: CPT

## 2022-11-23 PROCEDURE — 2580000003 HC RX 258: Performed by: NURSE PRACTITIONER

## 2022-11-23 PROCEDURE — 71045 X-RAY EXAM CHEST 1 VIEW: CPT

## 2022-11-23 PROCEDURE — 6370000000 HC RX 637 (ALT 250 FOR IP): Performed by: EMERGENCY MEDICINE

## 2022-11-23 PROCEDURE — G0378 HOSPITAL OBSERVATION PER HR: HCPCS

## 2022-11-23 PROCEDURE — 99220 PR INITIAL OBSERVATION CARE/DAY 70 MINUTES: CPT | Performed by: INTERNAL MEDICINE

## 2022-11-23 PROCEDURE — 85025 COMPLETE CBC W/AUTO DIFF WBC: CPT

## 2022-11-23 PROCEDURE — 84484 ASSAY OF TROPONIN QUANT: CPT

## 2022-11-23 PROCEDURE — 36415 COLL VENOUS BLD VENIPUNCTURE: CPT

## 2022-11-23 PROCEDURE — 6370000000 HC RX 637 (ALT 250 FOR IP): Performed by: NURSE PRACTITIONER

## 2022-11-23 PROCEDURE — 93005 ELECTROCARDIOGRAM TRACING: CPT | Performed by: EMERGENCY MEDICINE

## 2022-11-23 PROCEDURE — 99285 EMERGENCY DEPT VISIT HI MDM: CPT

## 2022-11-23 RX ORDER — CLOPIDOGREL BISULFATE 75 MG/1
75 TABLET ORAL DAILY
Status: DISCONTINUED | OUTPATIENT
Start: 2022-11-23 | End: 2022-11-23

## 2022-11-23 RX ORDER — ALBUTEROL SULFATE 90 UG/1
2 AEROSOL, METERED RESPIRATORY (INHALATION) EVERY 6 HOURS PRN
Status: DISCONTINUED | OUTPATIENT
Start: 2022-11-23 | End: 2022-11-24 | Stop reason: HOSPADM

## 2022-11-23 RX ORDER — LOSARTAN POTASSIUM 50 MG/1
100 TABLET ORAL DAILY
Status: DISCONTINUED | OUTPATIENT
Start: 2022-11-23 | End: 2022-11-24 | Stop reason: HOSPADM

## 2022-11-23 RX ORDER — POTASSIUM CHLORIDE 7.45 MG/ML
10 INJECTION INTRAVENOUS PRN
Status: DISCONTINUED | OUTPATIENT
Start: 2022-11-23 | End: 2022-11-24 | Stop reason: HOSPADM

## 2022-11-23 RX ORDER — ASPIRIN 81 MG/1
81 TABLET, CHEWABLE ORAL DAILY
Status: DISCONTINUED | OUTPATIENT
Start: 2022-11-24 | End: 2022-11-24 | Stop reason: HOSPADM

## 2022-11-23 RX ORDER — ACETAMINOPHEN 650 MG/1
650 SUPPOSITORY RECTAL EVERY 6 HOURS PRN
Status: DISCONTINUED | OUTPATIENT
Start: 2022-11-23 | End: 2022-11-24 | Stop reason: HOSPADM

## 2022-11-23 RX ORDER — ONDANSETRON 2 MG/ML
4 INJECTION INTRAMUSCULAR; INTRAVENOUS EVERY 6 HOURS PRN
Status: DISCONTINUED | OUTPATIENT
Start: 2022-11-23 | End: 2022-11-24 | Stop reason: HOSPADM

## 2022-11-23 RX ORDER — NITROGLYCERIN 0.4 MG/1
0.4 TABLET SUBLINGUAL EVERY 5 MIN PRN
Status: DISCONTINUED | OUTPATIENT
Start: 2022-11-23 | End: 2022-11-24 | Stop reason: HOSPADM

## 2022-11-23 RX ORDER — POLYETHYLENE GLYCOL 3350 17 G/17G
17 POWDER, FOR SOLUTION ORAL DAILY PRN
Status: DISCONTINUED | OUTPATIENT
Start: 2022-11-23 | End: 2022-11-24 | Stop reason: HOSPADM

## 2022-11-23 RX ORDER — CETIRIZINE HYDROCHLORIDE 10 MG/1
10 TABLET ORAL DAILY
Status: DISCONTINUED | OUTPATIENT
Start: 2022-11-24 | End: 2022-11-24 | Stop reason: HOSPADM

## 2022-11-23 RX ORDER — ACETAMINOPHEN 325 MG/1
650 TABLET ORAL EVERY 4 HOURS PRN
Status: DISCONTINUED | OUTPATIENT
Start: 2022-11-23 | End: 2022-11-24 | Stop reason: HOSPADM

## 2022-11-23 RX ORDER — IBUPROFEN 800 MG/1
800 TABLET ORAL
Status: DISCONTINUED | OUTPATIENT
Start: 2022-11-23 | End: 2022-11-23

## 2022-11-23 RX ORDER — PANTOPRAZOLE SODIUM 40 MG/1
40 TABLET, DELAYED RELEASE ORAL
Status: DISCONTINUED | OUTPATIENT
Start: 2022-11-24 | End: 2022-11-24 | Stop reason: HOSPADM

## 2022-11-23 RX ORDER — AMLODIPINE BESYLATE AND ATORVASTATIN CALCIUM 10; 10 MG/1; MG/1
1 TABLET, FILM COATED ORAL DAILY
Status: DISCONTINUED | OUTPATIENT
Start: 2022-11-23 | End: 2022-11-23 | Stop reason: RX

## 2022-11-23 RX ORDER — ONDANSETRON 4 MG/1
4 TABLET, ORALLY DISINTEGRATING ORAL EVERY 8 HOURS PRN
Status: DISCONTINUED | OUTPATIENT
Start: 2022-11-23 | End: 2022-11-24 | Stop reason: HOSPADM

## 2022-11-23 RX ORDER — SODIUM CHLORIDE 0.9 % (FLUSH) 0.9 %
5-40 SYRINGE (ML) INJECTION EVERY 12 HOURS SCHEDULED
Status: DISCONTINUED | OUTPATIENT
Start: 2022-11-23 | End: 2022-11-24 | Stop reason: HOSPADM

## 2022-11-23 RX ORDER — SODIUM CHLORIDE 9 MG/ML
INJECTION, SOLUTION INTRAVENOUS PRN
Status: DISCONTINUED | OUTPATIENT
Start: 2022-11-23 | End: 2022-11-24 | Stop reason: HOSPADM

## 2022-11-23 RX ORDER — HYDROCHLOROTHIAZIDE 25 MG/1
12.5 TABLET ORAL DAILY
Status: DISCONTINUED | OUTPATIENT
Start: 2022-11-23 | End: 2022-11-24 | Stop reason: HOSPADM

## 2022-11-23 RX ORDER — ACETAMINOPHEN 325 MG/1
650 TABLET ORAL EVERY 6 HOURS PRN
Status: DISCONTINUED | OUTPATIENT
Start: 2022-11-23 | End: 2022-11-23 | Stop reason: SDUPTHER

## 2022-11-23 RX ORDER — AMLODIPINE BESYLATE 10 MG/1
10 TABLET ORAL DAILY
Status: DISCONTINUED | OUTPATIENT
Start: 2022-11-23 | End: 2022-11-24 | Stop reason: HOSPADM

## 2022-11-23 RX ORDER — SODIUM CHLORIDE 0.9 % (FLUSH) 0.9 %
5-40 SYRINGE (ML) INJECTION PRN
Status: DISCONTINUED | OUTPATIENT
Start: 2022-11-23 | End: 2022-11-24 | Stop reason: HOSPADM

## 2022-11-23 RX ORDER — FLUTICASONE PROPIONATE 50 MCG
2 SPRAY, SUSPENSION (ML) NASAL DAILY
Status: DISCONTINUED | OUTPATIENT
Start: 2022-11-23 | End: 2022-11-24 | Stop reason: HOSPADM

## 2022-11-23 RX ORDER — VITAMIN B COMPLEX
1000 TABLET ORAL DAILY
Status: DISCONTINUED | OUTPATIENT
Start: 2022-11-23 | End: 2022-11-24 | Stop reason: HOSPADM

## 2022-11-23 RX ORDER — ESOMEPRAZOLE MAGNESIUM 20 MG/1
20 FOR SUSPENSION ORAL DAILY
Status: DISCONTINUED | OUTPATIENT
Start: 2022-11-23 | End: 2022-11-23 | Stop reason: CLARIF

## 2022-11-23 RX ORDER — HYDRALAZINE HYDROCHLORIDE 20 MG/ML
10 INJECTION INTRAMUSCULAR; INTRAVENOUS EVERY 6 HOURS PRN
Status: DISCONTINUED | OUTPATIENT
Start: 2022-11-23 | End: 2022-11-24 | Stop reason: HOSPADM

## 2022-11-23 RX ORDER — ATORVASTATIN CALCIUM 10 MG/1
10 TABLET, FILM COATED ORAL DAILY
Status: DISCONTINUED | OUTPATIENT
Start: 2022-11-23 | End: 2022-11-24 | Stop reason: HOSPADM

## 2022-11-23 RX ORDER — MAGNESIUM SULFATE IN WATER 40 MG/ML
2000 INJECTION, SOLUTION INTRAVENOUS PRN
Status: DISCONTINUED | OUTPATIENT
Start: 2022-11-23 | End: 2022-11-24 | Stop reason: HOSPADM

## 2022-11-23 RX ORDER — POTASSIUM CHLORIDE 20 MEQ/1
40 TABLET, EXTENDED RELEASE ORAL PRN
Status: DISCONTINUED | OUTPATIENT
Start: 2022-11-23 | End: 2022-11-24 | Stop reason: HOSPADM

## 2022-11-23 RX ADMIN — CLOPIDOGREL BISULFATE 75 MG: 75 TABLET ORAL at 14:34

## 2022-11-23 RX ADMIN — LOSARTAN POTASSIUM 100 MG: 50 TABLET, FILM COATED ORAL at 16:51

## 2022-11-23 RX ADMIN — IBUPROFEN 800 MG: 200 SUSPENSION ORAL at 13:38

## 2022-11-23 RX ADMIN — ATORVASTATIN CALCIUM 10 MG: 20 TABLET, FILM COATED ORAL at 16:52

## 2022-11-23 RX ADMIN — SODIUM CHLORIDE, PRESERVATIVE FREE 10 ML: 5 INJECTION INTRAVENOUS at 20:40

## 2022-11-23 RX ADMIN — HYDROCHLOROTHIAZIDE 12.5 MG: 25 TABLET ORAL at 16:52

## 2022-11-23 RX ADMIN — AMLODIPINE BESYLATE 10 MG: 10 TABLET ORAL at 16:52

## 2022-11-23 ASSESSMENT — ENCOUNTER SYMPTOMS
VOMITING: 0
ORTHOPNEA: 0
EYES NEGATIVE: 1
TROUBLE SWALLOWING: 0
HEARTBURN: 0
ALLERGIC/IMMUNOLOGIC NEGATIVE: 1
SHORTNESS OF BREATH: 0
ABDOMINAL PAIN: 0

## 2022-11-23 ASSESSMENT — HEART SCORE: ECG: 1

## 2022-11-23 NOTE — PROGRESS NOTES
TRANSFER - IN REPORT:    Verbal report received from [address] [facility] on [address] being received from ER () for routine progression of care. Report consisted of patients Situation, Background, Assessment and Recommendations(SBAR). Information from the following report(s) SBAR, Kardex, Procedure Summary, MAR, and Recent Results was reviewed. Opportunity for questions and clarification was provided. Assessment completed upon patients arrival to unit and care assumed. Patient received to room 320. Patient connected to monitor and assessment completed. Plan of care reviewed. Patient oriented to room and call light. Patient aware to use call light to communicate any chest pain or needs. Pt presented to the floor with skin in tact, legs red and shiny bilaterally, Heels are in tact. No sign of skin breakdown noted on sacrum.

## 2022-11-23 NOTE — ED NOTES
TRANSFER - OUT REPORT:    Verbal report given to Art RN on Lee Mae  being transferred to  320 for routine progression of patient care       Report consisted of patient's Situation, Background, Assessment and   Recommendations(SBAR). Information from the following report(s) ED Encounter Summary, ED SBAR and [facility] was reviewed with the receiving nurse. [place] Assessment: No data recorded  Lines:   Peripheral IV 11/23/22 Left Antecubital (Active)   Site Assessment Clean, dry & intact 11/23/22 1334   Line Status Blood return noted; Flushed;Normal saline locked 11/23/22 1334   Phlebitis Assessment No symptoms 11/23/22 1334   Infiltration Assessment 0 11/23/22 1334        Opportunity for questions and clarification was provided.       Patient transported with:  Monitor and Registered Nurse           Newton Hunt RN  11/23/22 0150

## 2022-11-23 NOTE — ED PROVIDER NOTES
Emergency Department Provider Note                   PCP:                German Hicks MD               Age: 80 y.o. Sex: female       ICD-10-CM    1. [facility]STEM[facility] (non[facility]ST [facility]  I21.4           DISPOSITION Decision To Admit 11/23/2022 02:26:42 PM        MDM  Number of Diagnoses or Management Options  NSTEMI (non-ST elevated myocardial infarction) [facility])  Diagnosis management comments: Patient has a significantly elevated cardiac enzyme and chest pain. We will consult to cardiology.        Amount and/or Complexity of Data Reviewed  Clinical lab tests: ordered and reviewed  Tests in the radiology section of CPT®: ordered and reviewed  Review and summarize past medical records: yes  Independent visualization of images, tracings, or specimens: yes    Risk of Complications, Morbidity, and/or Mortality  Presenting problems: moderate  Management options: moderate    Patient Progress  Patient progress: stable       ED Course as of 11/23/22 1433   Wed Nov 23, 2022   1329 EKG interpretation: Sinus rhythm, rate of 89, premature atrial complexes, no STEMI, nonspecific ST wave abnormality [SH]      ED Course User Index  [SH] Abhijit Isaacs MD        Orders Placed This Encounter   Procedures    XR CHEST PORTABLE    CBC with Auto Differential    CMP    Troponin    Troponin    EKG 12 Lead        Medications   clopidogrel (PLAVIX) tablet 75 mg (has no administration in time range)   ibuprofen (ADVIL;MOTRIN) 100 MG/5ML suspension 800 mg (800 mg Oral Given 11/23/22 1338)       New Prescriptions    No medications on file        Dallin Taylor is a 80 y.o. female who presents to the Emergency Department with chief complaint of    Chief Complaint   Patient presents with    Rib Pain (injury)      Patient is a 60-year-old female with a history of hypertension hyperlipidemia who states she is having left chest wall pain and left upper back pain acute onset approximately 10 AM and worse now.  Patient denies any shortness of breath. Patient states on Friday she was getting an injection on her back and she was on her stomach and when she moves she had pain in that area that lasted approximate 10 minutes. Patient does have a history of hypertension, esophageal stricture, GERD, L2 vertebral fracture but denies any cardiac history. The history is provided by the patient. Chest Pain  Pain location:  L lateral chest  Pain quality: aching and stabbing    Pain radiates to:  Does not radiate  Pain severity:  Mild  Onset quality:  Gradual  Duration:  1 day  Timing:  Constant  Progression:  Waxing and waning  Chronicity:  New  Context: movement and raising an arm    Context: not breathing    Relieved by:  Rest  Worsened by:  Certain positions  Ineffective treatments:  None tried  Associated symptoms: no abdominal pain, no AICD problem, no altered mental status, no anorexia, no anxiety, no dizziness, no dysphagia, no fatigue, no fever, no heartburn, no numbness, no orthopnea, no palpitations, no shortness of breath, no syncope and no vomiting        Review of Systems   Constitutional:  Negative for fatigue and fever. HENT:  Negative for trouble swallowing. Respiratory:  Negative for shortness of breath. Cardiovascular:  Positive for chest pain. Negative for palpitations, orthopnea and syncope. Gastrointestinal:  Negative for abdominal pain, anorexia, heartburn and vomiting. Neurological:  Negative for dizziness and numbness. All other systems reviewed and are negative. Past Medical History:   Diagnosis Date    Chronic pain         Past Surgical History:   Procedure Laterality Date    ORTHOPEDIC SURGERY  1997    left arm    ORTHOPEDIC SURGERY  2014    left hip        No family history on file. Social History     Socioeconomic History    Marital status:     Tobacco Use    Smoking status: Never    Smokeless tobacco: Never   Substance and Sexual Activity    Alcohol use: Not Currently    Drug use: Never         Guaifenesin, Aspirin, Iodine, Niacin, and Penicillins     Previous Medications    ACETAMINOPHEN (TYLENOL) 325 MG TABLET    Take 650 mg by mouth every 4 hours as needed    ALBUTEROL SULFATE  (90 BASE) MCG/ACT INHALER    Inhale 2 puffs into the lungs    AMLODIPINE-ATORVASTATATIN (CADUET) 10-10 MG PER TABLET    Take 1 tablet by mouth daily    ATORVASTATIN (LIPITOR) 20 MG TABLET    Take 20 mg by mouth daily    CALCIUM CARB-CHOLECALCIFEROL 600-800 MG-UNIT CHEW    Take 500 mg by mouth daily    ESOMEPRAZOLE MAGNESIUM (NEXIUM) 20 MG PACK    Take 20 mg by mouth daily    FLUTICASONE (FLONASE) 50 MCG/ACT NASAL SPRAY    2 sprays by Nasal route daily    LORATADINE-PSEUDOEPHEDRINE (CLARITIN-D 24 HOUR)  MG PER EXTENDED RELEASE TABLET    Take 1 tablet by mouth daily    LOSARTAN-HYDROCHLOROTHIAZIDE (HYZAAR) 100-12.5 MG PER TABLET    Take 1 tablet by mouth daily    VITAMIN D 25 MCG (1000 UT) CAPS    Take 1,000 Units by mouth daily        Vitals signs and nursing note reviewed. Patient Vitals for the past 4 hrs:   Temp Pulse Resp BP SpO2   11/23/22 1318 97.5 °F (36.4 °C) 91 18 (!) 186/77 97 %          Physical Exam  Vitals and nursing note reviewed. Constitutional:       Appearance: Normal appearance. HENT:      Head: Normocephalic and atraumatic. Nose: Nose normal.      Mouth/Throat:      Mouth: Mucous membranes are moist.      Pharynx: Oropharynx is clear. Eyes:      Extraocular Movements: Extraocular movements intact. Conjunctiva/sclera: Conjunctivae normal.      Pupils: Pupils are equal, round, and reactive to light. Cardiovascular:      Rate and Rhythm: Normal rate. Pulses: Normal pulses. Pulmonary:      Effort: Pulmonary effort is normal.   Abdominal:      General: Abdomen is flat. Palpations: Abdomen is soft. Musculoskeletal:      Cervical back: Normal range of motion and neck supple.       Comments: Left lateral and left chest wall tenderness palpation. No crepitus. Skin:     Capillary Refill: Capillary refill takes less than 2 seconds. Neurological:      General: No focal deficit present. Mental Status: She is alert and oriented to person, place, and time. Mental status is at baseline. Psychiatric:         Mood and Affect: Mood normal.         Behavior: Behavior normal.         Thought Content: Thought content normal.         Judgment: Judgment normal.        Procedures    Results for orders placed or performed during the hospital encounter of 11/23/22   XR CHEST PORTABLE    Narrative    EXAMINATION: XR CHEST PORTABLE 11/23/2022 1:50 PM    ACCESSION NUMBER: [id number]    COMPARISON: 9/8/2018    INDICATION: left chest pain    TECHNIQUE: A single view of the chest was obtained. FINDINGS:   Support Devices:   *  None    Cardiac Silhouette: Cardiac silhouette is stable in size, given the AP  technique. Mediastinum: Mildly tortuous thoracic aorta. Aortic arch calcifications. Lungs: No airspace consolidation. No pneumothorax or sizable pleural effusion. Upper Abdomen: Normal     Miscellaneous: No fracture or suspicious osseous lesion. Impression    No acute cardiopulmonary abnormality.        CBC with Auto Differential   Result Value Ref Range    WBC 9.6 4.3 - 11.1 K/uL    RBC 3.87 (L) 4.05 - 5.2 M/uL    Hemoglobin 12.3 11.7 - 15.4 g/dL    Hematocrit 36.0 35.8 - 46.3 %    MCV 93.0 82 - 102 FL    MCH 31.8 26.1 - 32.9 PG    MCHC 34.2 31.4 - 35.0 g/dL    RDW 13.0 11.9 - 14.6 %    Platelets 301 853 - 657 K/uL    MPV 9.6 9.4 - 12.3 FL    nRBC 0.00 0.0 - 0.2 K/uL    Differential Type AUTOMATED      Seg Neutrophils 74 43 - 78 %    Lymphocytes 16 13 - 44 %    Monocytes 6 4.0 - 12.0 %    Eosinophils % 3 0.5 - 7.8 %    Basophils 1 0.0 - 2.0 %    Immature Granulocytes 0 0.0 - 5.0 %    Segs Absolute 7.1 1.7 - 8.2 K/UL    Absolute Lymph # 1.6 0.5 - 4.6 K/UL    Absolute Mono # 0.6 0.1 - 1.3 K/UL    Absolute Eos # 0.3 0.0 - 0.8 K/UL Basophils Absolute 0.1 0.0 - 0.2 K/UL    Absolute Immature Granulocyte 0.0 0.0 - 0.5 K/UL   CMP   Result Value Ref Range    Sodium 136 133 - 143 mmol/L    Potassium 3.6 3.5 - 5.1 mmol/L    Chloride 104 101 - 110 mmol/L    CO2 24 21 - 32 mmol/L    Anion Gap 8 2 - 11 mmol/L    Glucose 104 (H) 65 - 100 mg/dL    BUN 17 8 - 23 MG/DL    Creatinine 1.10 (H) 0.6 - 1.0 MG/DL    Est, Glom Filt Rate 46 (L) >60 ml/min/1.73m2    Calcium 9.9 8.3 - 10.4 MG/DL    Total Bilirubin 0.5 0.2 - 1.1 MG/DL    ALT 22 12 - 65 U/L    AST 21 15 - 37 U/L    Alk Phosphatase 96 50 - 136 U/L    Total Protein 8.6 (H) 6.3 - 8.2 g/dL    Albumin 4.5 3.2 - 4.6 g/dL    Globulin 4.1 2.8 - 4.5 g/dL    Albumin/Globulin Ratio 1.1 0.4 - 1.6     Troponin   Result Value Ref Range    Troponin, High Sensitivity 137.4 (HH) 0 - 14 pg/mL   EKG 12 Lead   Result Value Ref Range    Ventricular Rate 89 BPM    Atrial Rate 89 BPM    P-R Interval 196 ms    QRS Duration 90 ms    Q-T Interval 356 ms    QTc Calculation (Bazett) 433 ms    P Axis 74 degrees    R Axis 61 degrees    T Axis 29 degrees    Diagnosis       Sinus rhythm with Premature atrial complexes with Aberrant conduction        XR CHEST PORTABLE   Final Result   No acute cardiopulmonary abnormality. Heart Score for chest pain patients  History: Moderately Suspicious  ECG: Non-Specifc repolarization disturbance/LBTB/PM  Patient Age: > 65 years  Risk Factors: > 3 Risk factors or history of atherosclerotic disease*  Troponin: > 3X normal limit  Heart Score Total: 8          Voice dictation software was used during the making of this note. This software is not perfect and grammatical and other typographical errors may be present. This note has not been completely proofread for errors.        Jeanette Sims MD  11/23/22 43 Tammie Arnold MD  11/23/22 1272

## 2022-11-23 NOTE — H&P
[facility] Cardiology Initial Cardiac Evaluation                Date of  Admission: 11/23/2022  1:23 PM     PCP: Rich Coates MD  Requested by: Dr. Dixie Gaaln  Primary Cardiologist: None  APC Attending: Dr. Haydee Hutchison    Reason for Evaluation: Atypical Chest Pain      Melecio Tate is a 80 y.o. female with hx of chronic pain, GERD and HTN. The patient [place] [facility] with family for complaints of left sided chest pain that has been present but intermittent for a few days after reaching up high for an item. Pt states that she has no prior CAD hx but reports familial hx of cardiac disease. She denies SOB or LE edema during this period. Denies angina at rest or S[place]. The patient endorses movement and/or deep breathing exacerbates this pain. Tender to palpation over the left chest noted during exam. No trauma to the area. On arrival, she was noted to have elevated BP's reaching as high as 064 systolic while in the room. Last /64. Labs are significant for: HS Trop 137.4->126.5 respectively, creatinine 1.10, K+ 3.6, CXR unremarkable. As stated, no angina at this time. Recent Cardiac Synopsis      Echo: Pending completion    EKG: NSR with PAC aberrancy         Past Medical History:   Diagnosis Date    Chronic pain       Past Surgical History:   Procedure Laterality Date    ORTHOPEDIC SURGERY  1997    left arm    ORTHOPEDIC SURGERY  2014    left hip     Allergies   Allergen Reactions    Guaifenesin Other (See Comments)     Makes me climb the walls    Aspirin Palpitations    Iodine Rash    Niacin Rash    Penicillins Palpitations      No family history on file. Social History       Tobacco History       Smoking Status  Never      Smokeless Tobacco Use  Never              Alcohol History       Alcohol Use Status  Not Currently              Drug Use       Drug Use Status  Never              Sexual Activity       Sexually Active  Not Asked                     Not in a hospital admission.      Current Facility-Administered Medications   Medication Dose Route Frequency    clopidogrel (PLAVIX) tablet 75 mg  75 mg Oral Daily     Current Outpatient Medications   Medication Sig    acetaminophen (TYLENOL) 325 MG tablet Take 650 mg by mouth every 4 hours as needed    albuterol sulfate  (90 Base) MCG/ACT inhaler Inhale 2 puffs into the lungs    amLODIPine-atorvastatatin (CADUET) 10-10 MG per tablet Take 1 tablet by mouth daily    atorvastatin (LIPITOR) 20 MG tablet Take 20 mg by mouth daily    Calcium Carb-Cholecalciferol 600-800 MG-UNIT CHEW Take 500 mg by mouth daily    vitamin D 25 MCG (1000 UT) CAPS Take 1,000 Units by mouth daily    esomeprazole Magnesium (NEXIUM) 20 MG PACK Take 20 mg by mouth daily    fluticasone (FLONASE) 50 MCG/ACT nasal spray 2 sprays by Nasal route daily    loratadine-pseudoephedrine (CLARITIN-D 24 HOUR)  MG per extended release tablet Take 1 tablet by mouth daily    losartan-hydroCHLOROthiazide (HYZAAR) 100-12.5 MG per tablet Take 1 tablet by mouth daily       Review of Systems   Constitutional: Negative. HENT: Negative. Eyes: Negative. Cardiovascular:  Positive for chest pain. Negative for dyspnea on exertion, irregular heartbeat, leg swelling, orthopnea and palpitations. Respiratory:  Negative for shortness of breath. Endocrine: Negative. Hematologic/Lymphatic: Negative. Skin: Negative. Musculoskeletal: Negative. Genitourinary: Negative. Neurological: Negative. Psychiatric/Behavioral: Negative. Allergic/Immunologic: Negative. Physical Exam  Vitals:    11/23/22 1318 11/23/22 1545   BP: (!) 186/77 (!) 162/64   Pulse: 91 81   Resp: 18 19   Temp: 97.5 °F (36.4 °C)    TempSrc: Oral    SpO2: 97% 94%       No data found.     No intake or output data in the 24 hours ending 11/23/22 1616    Net IO Since Admission: No IO data has been entered for this period [11/23/22 1616]      Physical Exam:  General: Frail, Thin, No Acute Distress  HEENT: pupils equal and round, no abnormalities noted  Neck: supple, no JVD, no carotid bruits  Heart: S1S2 with RRR without murmurs or gallops, left chest tender to palpation and movement   Lungs: Clear throughout auscultation bilaterally without adventitious sounds  Abd: soft, nontender, nondistended, with good bowel sounds  Ext: warm, no edema, calves supple/nontender, pulses 2+ bilaterally  Skin: warm and dry  Psychiatric: Normal mood and affect  Neurologic: Alert and oriented X 3      Recent Results (from the past 24 hour(s))   EKG 12 Lead    Collection Time: 11/23/22  1:20 PM   Result Value Ref Range    Ventricular Rate 89 BPM    Atrial Rate 89 BPM    P-R Interval 196 ms    QRS Duration 90 ms    Q-T Interval 356 ms    QTc Calculation (Bazett) 433 ms    P Axis 74 degrees    R Axis 61 degrees    T Axis 29 degrees    Diagnosis       Sinus rhythm with Premature atrial complexes with Aberrant conduction   CBC with Auto Differential    Collection Time: 11/23/22  1:31 PM   Result Value Ref Range    WBC 9.6 4.3 - 11.1 K/uL    RBC 3.87 (L) 4.05 - 5.2 M/uL    Hemoglobin 12.3 11.7 - 15.4 g/dL    Hematocrit 36.0 35.8 - 46.3 %    MCV 93.0 82 - 102 FL    MCH 31.8 26.1 - 32.9 PG    MCHC 34.2 31.4 - 35.0 g/dL    RDW 13.0 11.9 - 14.6 %    Platelets 620 898 - 566 K/uL    MPV 9.6 9.4 - 12.3 FL    nRBC 0.00 0.0 - 0.2 K/uL    Differential Type AUTOMATED      Seg Neutrophils 74 43 - 78 %    Lymphocytes 16 13 - 44 %    Monocytes 6 4.0 - 12.0 %    Eosinophils % 3 0.5 - 7.8 %    Basophils 1 0.0 - 2.0 %    Immature Granulocytes 0 0.0 - 5.0 %    Segs Absolute 7.1 1.7 - 8.2 K/UL    Absolute Lymph # 1.6 0.5 - 4.6 K/UL    Absolute Mono # 0.6 0.1 - 1.3 K/UL    Absolute Eos # 0.3 0.0 - 0.8 K/UL    Basophils Absolute 0.1 0.0 - 0.2 K/UL    Absolute Immature Granulocyte 0.0 0.0 - 0.5 K/UL   CMP    Collection Time: 11/23/22  1:31 PM   Result Value Ref Range    Sodium 136 133 - 143 mmol/L    Potassium 3.6 3.5 - 5.1 mmol/L    Chloride 104 101 - 110 mmol/L    CO2 24 21 - 32 mmol/L    Anion Gap 8 2 - 11 mmol/L    Glucose 104 (H) 65 - 100 mg/dL    BUN 17 8 - 23 MG/DL    Creatinine 1.10 (H) 0.6 - 1.0 MG/DL    Est, Glom Filt Rate 46 (L) >60 ml/min/1.73m2    Calcium 9.9 8.3 - 10.4 MG/DL    Total Bilirubin 0.5 0.2 - 1.1 MG/DL    ALT 22 12 - 65 U/L    AST 21 15 - 37 U/L    Alk Phosphatase 96 50 - 136 U/L    Total Protein 8.6 (H) 6.3 - 8.2 g/dL    Albumin 4.5 3.2 - 4.6 g/dL    Globulin 4.1 2.8 - 4.5 g/dL    Albumin/Globulin Ratio 1.1 0.4 - 1.6     Troponin    Collection Time: 11/23/22  1:31 PM   Result Value Ref Range    Troponin, High Sensitivity 137.4 (HH) 0 - 14 pg/mL   Troponin    Collection Time: 11/23/22  2:38 PM   Result Value Ref Range    Troponin, High Sensitivity 126.5 (HH) 0 - 14 pg/mL        XR CHEST PORTABLE    Result Date: 11/23/2022  EXAMINATION: XR CHEST PORTABLE 11/23/2022 1:50 PM ACCESSION NUMBER: [id number] COMPARISON: 9/8/2018 INDICATION: left chest pain TECHNIQUE: A single view of the chest was obtained. FINDINGS: Support Devices: *  None Cardiac Silhouette: Cardiac silhouette is stable in size, given the AP technique. Mediastinum: Mildly tortuous thoracic aorta. Aortic arch calcifications. Lungs: No airspace consolidation. No pneumothorax or sizable pleural effusion. Upper Abdomen: Normal Miscellaneous: No fracture or suspicious osseous lesion. No acute cardiopulmonary abnormality. Initial Recommendations:      Cardiac Problems:    Atypical Chest Pain-    Left sided reproducible pain. No CAD hx. Worse with movement and deep breathing. BP noted to be as high as 311'Z systolic during assessment but reports she did not take her am meds likely cause troponin leak. We will restart home Losartan, HCTZ, Plavix and Caduet per home regimen with dosing now for BP management. Will trend troponins. 10 mg Hydralazine IV PRN for BP greater than 160. Monitor BP and telemetry. Will place NPO at midnight. Electrolyte replacement as needed for K>4. TTE ordered. Essential HTN-    See above for BP management. Low NA diet. Elevated Troponin-    As above. Trend troponins. Thank you very much for requesting a Cardiology Evaluation. We appreciate the opportunity to participate in this patient's care. We will follow along with above stated plan. This is initial management plan but please see attendings consult note for full plan of care. Satish Mohr NP Student    This chart has been reviewed by the student for teaching purposes. The patient has been examined, interviewed, chart review, with plan come up with by me independently. Appropriate changes were made to the note and plan of care where needed by me. Martin Gloria, APRN - CNP  11/23/22    4:50 PM    ATTENDING ADDENDUM:    In this split/shared evaluation I performed/reviewed the patients's H&P, available images, labs, non-invasive studies and discussed the case in detail with the ACP;  performed a medically appropriate history and exam, counseled and educated the patient and/or family members, ordered and reviewed medications, tests or procedures, documented information in EMR, and independently interpreted images and coordinated care. Personal Time:     38 minutes, which equates to greater than 50% of time involved in patient's consultation and care management. I agree with above note by physician extender. Key findings are:  No heart failure or arrhythmic symptoms but mild elevation of troponin in the setting of severe hypertension with systolics up to 918 mmHg in the ER. She denies any anginal sounding chest pain but has significant musculoskeletal chest pain that radiates from the left chest into the left scapular region with palpation or movement. She is exquisitely tender and her discomfort is noncoronary in etiology. Her troponin is likely due to demand ischemia and will be left to medical therapy given the lack of any anginal sounding chest pain and extreme age.   Troponin is already falling. Renal function is normal.  ECG unremarkable and chest x-ray unremarkable. She is fairly euvolemic. She has not had any of her meds yet today and we will administer all her home meds and follow troponin and blood pressure overnight with as needed hydralazine and nitroglycerin.     Current Facility-Administered Medications   Medication Dose Route Frequency Provider Last Rate Last Admin    clopidogrel (PLAVIX) tablet 75 mg  75 mg Oral Daily Kelli BRANDT MD   75 mg at 11/23/22 1434    losartan (COZAAR) tablet 100 mg  100 mg Oral Daily Christy Simpson APRN - CNP   100 mg at 11/23/22 1651    hydroCHLOROthiazide (HYDRODIURIL) tablet 12.5 mg  12.5 mg Oral Daily Christy Simpson APRN - CNP   12.5 mg at 11/23/22 1652    hydrALAZINE (APRESOLINE) injection 10 mg  10 mg IntraVENous Q6H PRN Christy Simpson APRN - CNP        amLODIPine (NORVASC) tablet 10 mg  10 mg Oral Daily Christy Simpson APRN - CNP   10 mg at 11/23/22 1652    atorvastatin (LIPITOR) tablet 10 mg  10 mg Oral Daily Christy Simpson, APRN - CNP   10 mg at 11/23/22 1652     Current Outpatient Medications   Medication Sig Dispense Refill    acetaminophen (TYLENOL) 325 MG tablet Take 650 mg by mouth every 4 hours as needed      albuterol sulfate  (90 Base) MCG/ACT inhaler Inhale 2 puffs into the lungs      amLODIPine-atorvastatatin (CADUET) 10-10 MG per tablet Take 1 tablet by mouth daily      atorvastatin (LIPITOR) 20 MG tablet Take 20 mg by mouth daily      Calcium Carb-Cholecalciferol 600-800 MG-UNIT CHEW Take 500 mg by mouth daily      vitamin D 25 MCG (1000 UT) CAPS Take 1,000 Units by mouth daily      esomeprazole Magnesium (NEXIUM) 20 MG PACK Take 20 mg by mouth daily      fluticasone (FLONASE) 50 MCG/ACT nasal spray 2 sprays by Nasal route daily      loratadine-pseudoephedrine (CLARITIN-D 24 HOUR)  MG per extended release tablet Take 1 tablet by mouth daily      losartan-hydroCHLOROthiazide (HYZAAR) 100-12.5 MG per tablet Take 1 tablet by mouth daily       Allergies   Allergen Reactions    Guaifenesin Other (See Comments)     Makes me climb the walls    Aspirin Palpitations    Iodine Rash    Niacin Rash    Penicillins Palpitations     Patient Active Problem List    Diagnosis Date Noted    Atypical chest pain 11/23/2022     Priority: High    Troponin I above reference range 11/23/2022     Priority: High    Hypertensive urgency 11/23/2022     Priority: High    Cervical spondylosis 10/24/2022     Priority: High    Vitamin D deficiency 01/24/2019     Priority: High    Essential hypertension 01/05/2011     Priority: High     Overview Note:     Last Assessment & Plan:   Formatting of this note might be different from the original.  Hypertension is improving with treatment. Continue current medications. Blood pressure will be reassessed at the next regular appointment.       Esophageal tear 12/13/2021     Past Surgical History:   Procedure Laterality Date    ORTHOPEDIC SURGERY  1997    left arm    ORTHOPEDIC SURGERY  2014    left hip     Social History     Tobacco Use    Smoking status: Never    Smokeless tobacco: Never   Substance Use Topics    Alcohol use: Not Currently       REVIEW OF SYSTEMS:    General: no recent weight loss/gain,no weakness/fatigue, denies fever or chills   Skin: no rashes, lumps, or other skin changes   HEENT: no headache, dizziness, lightheadedness, vision changes, hearing changes   Neck: no swollen glands, goiter, pain or stiffness   Respiratory: no cough, sputum, hemoptysis, no shortness of breath, no dyspnea on exertion, wheezing   Cardiovascular: no anginal chest pain or discomfort, palpitations, no orthopnea, paroxysmal nocturnal dyspnea or peripheral edema   Gastrointestinal:  heartburn, change of appetite, nausea, change in bowel habits  Urinary: no frequency, urgency , hematuria, burning/pain  Peripheral Vascular: no claudication, leg cramps, prior DVTs  Musculoskeletal: + Chest wall pain, left back pain periscapular  Psychiatric: no depression, mental disorders, or excessive stress   Neurological: no history of CVA, vertigo. Hematologic: no anemia, easy bruising or bleeding   Endocrine: no diabetes, thyroid problems, heat or cold intolerance, excessive sweating, polyuria, polydipsia        PHYSICAL EXAM:    Vitals:    11/23/22 1318 11/23/22 1545 11/23/22 1629 11/23/22 1644   BP: (!) 186/77 (!) 162/64 (!) 159/77 (!) 148/79   Pulse: 91 81 84 84   Resp: 18 19 20 27   Temp: 97.5 °F (36.4 °C)      TempSrc: Oral      SpO2: 97% 94% 94% 97%       General: Well Developed, Well Nourished, No Acute Distress  HEENT: pupils equal and round, no abnormalities noted  Neck: supple, no JVD sitting upright, no carotid bruits  Heart: S1S2 with RRR with soft aortic sclerosis and TR murmurs, no S3  Lungs: Clear throughout auscultation bilaterally, mildly decreased bibasilar but no crackles or wheezing  Abd: soft, nontender, nondistended  Ext: No edema distally, exquisitely tender to palpation of the left chest and left back just inferior to the scapula. No obvious fracture. Skin: warm and dry  Psychiatric: Normal mood and affect  Neurologic: Alert and oriented X 3, cranial nerves II thru XII grossly intact and symmetric      ASSESSMENT AND PLAN:  Active Hospital Problems    *Atypical chest pain-clearly musculoskeletal.  Reproducible in the ER. ECG unremarkable. Troponin mildly elevated but this is most likely demand ischemia with systemic hypertension at 80years of age. See below. No further evaluation warranted. Troponin I above reference range-very mild elevation of troponin in a 80year-old with spikes in blood pressure. Likely demand ischemia. Her chest pain is noncoronary/noncardiac. Follow clinically and check echo. Plan for discharge tomorrow if improved symptomatically. Hypertensive urgency-possibly pain and anxiety driven, already improving in the ER despite no therapy.   Systolic still in the 255C and no meds yet this morning. Give usual home meds, as needed IV hydralazine, add nitroglycerin paste as needed and titrate meds as needed based on results of vital signs overnight. Essential hypertension-give usual home meds now, see above. Minimize sodium. Titrate meds as needed       MD Nayely Baker Cardiology  Pager [phone]

## 2022-11-23 NOTE — ED TRIAGE NOTES
Pt via wheelchair to triage with family for reports of L sided rib cage & musculoskeletal chest up to L shoulder pain. Pt states on Friday she felt a tightening when she was turning at her doctors office to get an injection on her back. Pt states pain went away for some time but then came back around 1000 this morning. Pt with reproducible pain to palpation. Pt a&ox4.

## 2022-11-24 ENCOUNTER — APPOINTMENT (OUTPATIENT)
Dept: NON INVASIVE DIAGNOSTICS | Age: 87
End: 2022-11-24
Payer: MEDICARE

## 2022-11-24 VITALS
WEIGHT: 124.2 LBS | SYSTOLIC BLOOD PRESSURE: 149 MMHG | HEART RATE: 70 BPM | RESPIRATION RATE: 18 BRPM | DIASTOLIC BLOOD PRESSURE: 45 MMHG | TEMPERATURE: 97.7 F | BODY MASS INDEX: 24.67 KG/M2 | OXYGEN SATURATION: 96 %

## 2022-11-24 LAB
ANION GAP SERPL CALC-SCNC: 5 MMOL/L (ref 2–11)
BUN SERPL-MCNC: 15 MG/DL (ref 8–23)
CALCIUM SERPL-MCNC: 9.4 MG/DL (ref 8.3–10.4)
CHLORIDE SERPL-SCNC: 109 MMOL/L (ref 101–110)
CO2 SERPL-SCNC: 26 MMOL/L (ref 21–32)
CREAT SERPL-MCNC: 0.9 MG/DL (ref 0.6–1)
ECHO AO ASC DIAM: 3 CM
ECHO AO ROOT DIAM: 2.9 CM
ECHO AR MAX VEL PISA: 3.1 M/S
ECHO AV AREA PEAK VELOCITY: 1.5 CM2
ECHO AV AREA VTI: 1.7 CM2
ECHO AV MEAN GRADIENT: 14 MMHG
ECHO AV MEAN VELOCITY: 1.7 M/S
ECHO AV MEAN VELOCITY: 1.7 M/S
ECHO AV PEAK GRADIENT: 30 MMHG
ECHO AV PEAK VELOCITY: 2.7 M/S
ECHO AV REGURGITANT PHT: 481 MS
ECHO AV VELOCITY RATIO: 0.59
ECHO AV VTI: 58.1 CM
ECHO EST RA PRESSURE: 3 MMHG
ECHO IVC PROX: 1.6 CM
ECHO LA AREA 2C: 20.2 CM2
ECHO LA AREA 4C: 21.9 CM2
ECHO LA DIAMETER: 3.9 CM
ECHO LA MAJOR AXIS: 6.5 CM
ECHO LA MINOR AXIS: 5.4 CM
ECHO LA TO AORTIC ROOT RATIO: 1.34
ECHO LA VOL 2C: 62 ML (ref 22–52)
ECHO LA VOL 4C: 59 ML (ref 22–52)
ECHO LA VOL BP: 66 ML (ref 22–52)
ECHO LV E' LATERAL VELOCITY: 10 CM/S
ECHO LV E' SEPTAL VELOCITY: 8 CM/S
ECHO LV EDV 3D: 81 ML
ECHO LV EJECTION FRACTION 3D: 67 %
ECHO LV ESV 3D: 27 ML
ECHO LV FRACTIONAL SHORTENING: 38 % (ref 28–44)
ECHO LV INTERNAL DIMENSION DIASTOLIC: 4.2 CM (ref 3.9–5.3)
ECHO LV INTERNAL DIMENSION SYSTOLIC: 2.6 CM
ECHO LV IVSD: 1.2 CM (ref 0.6–0.9)
ECHO LV MASS 2D: 178.2 G (ref 67–162)
ECHO LV MASS 3D: 138 G
ECHO LV POSTERIOR WALL DIASTOLIC: 1.2 CM (ref 0.6–0.9)
ECHO LV RELATIVE WALL THICKNESS RATIO: 0.57
ECHO LVOT AREA: 2.5 CM2
ECHO LVOT AV VTI INDEX: 0.65
ECHO LVOT DIAM: 1.8 CM
ECHO LVOT MEAN GRADIENT: 6 MMHG
ECHO LVOT PEAK GRADIENT: 10 MMHG
ECHO LVOT PEAK VELOCITY: 1.6 M/S
ECHO LVOT SV: 95.9 ML
ECHO LVOT VTI: 37.7 CM
ECHO MV A VELOCITY: 1.35 M/S
ECHO MV AREA VTI: 2.1 CM2
ECHO MV E DECELERATION TIME (DT): 284 MS
ECHO MV E VELOCITY: 1.31 M/S
ECHO MV E/A RATIO: 0.97
ECHO MV E/E' LATERAL: 13.1
ECHO MV E/E' RATIO (AVERAGED): 14.74
ECHO MV E/E' SEPTAL: 16.38
ECHO MV LVOT VTI INDEX: 1.21
ECHO MV MAX VELOCITY: 1.6 M/S
ECHO MV MEAN GRADIENT: 4 MMHG
ECHO MV MEAN VELOCITY: 0.9 M/S
ECHO MV PEAK GRADIENT: 10 MMHG
ECHO MV VTI: 45.6 CM
ECHO RIGHT VENTRICULAR SYSTOLIC PRESSURE (RVSP): 40 MMHG
ECHO RV BASAL DIMENSION: 3.5 CM
ECHO RV FREE WALL PEAK S': 14 CM/S
ECHO RV TAPSE: 1.8 CM (ref 1.7–?)
ECHO TV REGURGITANT MAX VELOCITY: 3.04 M/S
ECHO TV REGURGITANT PEAK GRADIENT: 37 MMHG
EKG ATRIAL RATE: 65 BPM
EKG DIAGNOSIS: NORMAL
EKG P AXIS: 79 DEGREES
EKG P-R INTERVAL: 214 MS
EKG Q-T INTERVAL: 432 MS
EKG QRS DURATION: 92 MS
EKG QTC CALCULATION (BAZETT): 449 MS
EKG R AXIS: 39 DEGREES
EKG T AXIS: 41 DEGREES
EKG VENTRICULAR RATE: 65 BPM
ERYTHROCYTE [DISTWIDTH] IN BLOOD BY AUTOMATED COUNT: 13.2 % (ref 11.9–14.6)
GLUCOSE SERPL-MCNC: 93 MG/DL (ref 65–100)
HCT VFR BLD AUTO: 32.8 % (ref 35.8–46.3)
HGB BLD-MCNC: 10.7 G/DL (ref 11.7–15.4)
MCH RBC QN AUTO: 30.9 PG (ref 26.1–32.9)
MCHC RBC AUTO-ENTMCNC: 32.6 G/DL (ref 31.4–35)
MCV RBC AUTO: 94.8 FL (ref 82–102)
NRBC # BLD: 0 K/UL (ref 0–0.2)
PLATELET # BLD AUTO: 238 K/UL (ref 150–450)
PMV BLD AUTO: 10 FL (ref 9.4–12.3)
POTASSIUM SERPL-SCNC: 3.9 MMOL/L (ref 3.5–5.1)
RBC # BLD AUTO: 3.46 M/UL (ref 4.05–5.2)
SODIUM SERPL-SCNC: 140 MMOL/L (ref 133–143)
WBC # BLD AUTO: 6.3 K/UL (ref 4.3–11.1)

## 2022-11-24 PROCEDURE — 80048 BASIC METABOLIC PNL TOTAL CA: CPT

## 2022-11-24 PROCEDURE — 36415 COLL VENOUS BLD VENIPUNCTURE: CPT

## 2022-11-24 PROCEDURE — 6370000000 HC RX 637 (ALT 250 FOR IP): Performed by: INTERNAL MEDICINE

## 2022-11-24 PROCEDURE — 2580000003 HC RX 258: Performed by: NURSE PRACTITIONER

## 2022-11-24 PROCEDURE — 76376 3D RENDER W/INTRP POSTPROCES: CPT | Performed by: INTERNAL MEDICINE

## 2022-11-24 PROCEDURE — 6370000000 HC RX 637 (ALT 250 FOR IP): Performed by: NURSE PRACTITIONER

## 2022-11-24 PROCEDURE — 85027 COMPLETE CBC AUTOMATED: CPT

## 2022-11-24 PROCEDURE — G0378 HOSPITAL OBSERVATION PER HR: HCPCS

## 2022-11-24 PROCEDURE — 99217 PR OBSERVATION CARE DISCHARGE MANAGEMENT: CPT | Performed by: INTERNAL MEDICINE

## 2022-11-24 PROCEDURE — 93306 TTE W/DOPPLER COMPLETE: CPT | Performed by: INTERNAL MEDICINE

## 2022-11-24 PROCEDURE — 93306 TTE W/DOPPLER COMPLETE: CPT

## 2022-11-24 PROCEDURE — 93005 ELECTROCARDIOGRAM TRACING: CPT | Performed by: NURSE PRACTITIONER

## 2022-11-24 RX ORDER — HYDRALAZINE HYDROCHLORIDE 25 MG/1
25 TABLET, FILM COATED ORAL 3 TIMES DAILY PRN
Qty: 270 TABLET | Refills: 3 | Status: SHIPPED | OUTPATIENT
Start: 2022-11-24

## 2022-11-24 RX ADMIN — Medication 2 TABLET: at 08:39

## 2022-11-24 RX ADMIN — FLUTICASONE PROPIONATE 2 SPRAY: 50 SPRAY, METERED NASAL at 08:44

## 2022-11-24 RX ADMIN — LOSARTAN POTASSIUM 100 MG: 50 TABLET, FILM COATED ORAL at 08:39

## 2022-11-24 RX ADMIN — HYDROCHLOROTHIAZIDE 12.5 MG: 25 TABLET ORAL at 08:40

## 2022-11-24 RX ADMIN — PANTOPRAZOLE SODIUM 40 MG: 40 TABLET, DELAYED RELEASE ORAL at 05:59

## 2022-11-24 RX ADMIN — SODIUM CHLORIDE, PRESERVATIVE FREE 10 ML: 5 INJECTION INTRAVENOUS at 08:43

## 2022-11-24 RX ADMIN — AMLODIPINE BESYLATE 10 MG: 10 TABLET ORAL at 08:39

## 2022-11-24 RX ADMIN — CETIRIZINE HYDROCHLORIDE 10 MG: 10 TABLET, FILM COATED ORAL at 08:38

## 2022-11-24 RX ADMIN — ATORVASTATIN CALCIUM 10 MG: 20 TABLET, FILM COATED ORAL at 08:39

## 2022-11-24 NOTE — PLAN OF CARE
Problem: Discharge Planning  Goal: Discharge to home or other facility with appropriate resources  11/24/2022 1327 by Mikki Cook RN  Outcome: Completed  Flowsheets (Taken 11/24/2022 1395)  Discharge to home or other facility with appropriate resources:   Arrange for needed discharge resources and transportation as appropriate   Identify discharge learning needs (meds, wound care, etc)   Arrange for interpreters to assist at discharge as needed   Identify barriers to discharge with patient and caregiver   Refer to discharge planning if patient needs post-hospital services based on physician order or complex needs related to functional status, cognitive ability or social support system  11/24/2022 0148 by Hu Malik RN  Outcome: Progressing     Problem: Safety - Adult  Goal: Free from fall injury  11/24/2022 1327 by Mikki Cook RN  Outcome: Completed  Flowsheets (Taken 11/24/2022 0850)  Free From Fall Injury: Instruct family/caregiver on patient safety  11/24/2022 0148 by Hu Malik RN  Outcome: Progressing     Problem: ABCDS Injury Assessment  Goal: Absence of physical injury  11/24/2022 1327 by Mikki Cook RN  Outcome: Completed  Flowsheets (Taken 11/24/2022 0850)  Absence of Physical Injury: Implement safety measures based on patient assessment  11/24/2022 0148 by Hu Malik RN  Outcome: Progressing     Problem: Pain  Goal: Verbalizes/displays adequate comfort level or baseline comfort level  11/24/2022 1327 by Mikki Cook RN  Outcome: Completed  11/24/2022 0148 by Hu Malik RN  Outcome: Progressing

## 2022-11-24 NOTE — PROGRESS NOTES
[facility] CARDIOLOGY PROGRESS NOTE    11/24/2022 7:28 AM    Admit Date: 11/23/2022        Subjective:   Stable overnight without angina, CHF, or palpitations. Vitals stable and controlled. Chest wall/scapular back pain much improved today and mild at most.  Moving about in bed much better with no severe discomfort. No other complaints overnight. Tolerating meds well. Objective:      Vitals:    11/23/22 1715 11/23/22 2015 11/24/22 0020 11/24/22 0417   BP: (!) 166/62 (!) 119/46 (!) 142/58 (!) 132/44   Pulse: 78 52 65 (!) 47   Resp: 20 18 18 18   Temp: 97.5 °F (36.4 °C) 97.5 °F (36.4 °C) 97.3 °F (36.3 °C) 97.5 °F (36.4 °C)   TempSrc: Oral Oral Oral Oral   SpO2: 95% 94% 95% 94%   Weight:    124 lb 3.2 oz (56.3 kg)       Physical Exam:  Neck- supple, no JVD  CV- regular rate and rhythm no MRG  Lung- clear bilaterally, mildly decreased bibasilar but no crackles or wheezing  Abd- soft, nontender, nondistended  Ext- no edema  Skin- warm and dry    Data Review:   Recent Labs     11/23/22  1331 11/24/22  0537    140   K 3.6 3.9   BUN 17 15   WBC 9.6 6.3   HGB 12.3 10.7*   HCT 36.0 32.8*    238       Assessment and Plan: Active Hospital Problems    *Atypical chest pain-clearly musculoskeletal and much improved today. Serial troponins flat and likely due to demand ischemia. ECG unremarkable. See below. No further evaluation warranted. Troponin I above reference range-very mild flat serial elevation of troponin in a 80year-old with spikes in blood pressure. Likely demand ischemia. Her chest pain is noncoronary/noncardiac. Follow clinically and check echo. Plan for discharge later today if her echo looks good and she continues to improve. Hypertensive urgency-possibly pain and anxiety driven, improved with reduction in chest wall pain and resumption of home medications. Acceptable systolics given her extreme age. Essential hypertension-give usual home meds now, see above. Minimize sodium. Titrate meds as needed     If echo looks good this morning, we will plan to discharge later today with outpatient follow-up.   Chacha Domingo MD

## 2022-11-24 NOTE — CARE COORDINATION
11/24/22 [facility] Discharge   Transition of Care Consult (CM Consult) Discharge Planning       Chart screened by  for discharge planning. No needs identified at this time. Please consult  if any new issues arise.

## 2022-11-24 NOTE — DISCHARGE SUMMARY
[facility] Cardiology Discharge Summary     Patient ID:  Henri Rodriguez  [id number]  89 y.o.  11/2/1927    Admit date: 11/23/2022    Discharge date:  11/24/2022    Admitting Physician: Damian Finley MD     Discharge Physician: Dr. Nataly Haile    Admission Diagnoses: Atypical chest pain [R07.89]    Hypertensive urgency [I16.0]    Discharge Diagnoses:   Patient Active Problem List    Diagnosis    Atypical chest pain    Demand ischemia     Hypertensive urgency    Cervical spondylosis    Vitamin D deficiency    Essential hypertension       Cardiology Procedures this admission:   None  Consults: none    Hospital Course: Patient presented to the ER with c/o left sided chest pain. Chest pain was reproducible with palpation. BP on arrival to ED was elevated with SBP in 200s. Troponin mildly elevated at 137 and repeat of 126. In setting of hypertensive urgency elevated troponin secondary to demand ischemia. The patient was continued on her previous home medications for hypertension. As needed hydralazine was added as well for systolic pressures greater than 170 mmHg    Echo results: Normal left ventricular systolic function with mild aortic stenosis.        Medication List        START taking these medications      hydrALAZINE 25 MG tablet  Commonly known as: APRESOLINE  Take 1 tablet by mouth 3 times daily as needed (Systolic pressure > 414 mm Hg)            CONTINUE taking these medications      acetaminophen 325 MG tablet  Commonly known as: TYLENOL     albuterol sulfate  (90 Base) MCG/ACT inhaler  Commonly known as: PROVENTIL;VENTOLIN;PROAIR     amLODIPine-atorvastatatin 10-10 MG per tablet  Commonly known as: CADUET     atorvastatin 20 MG tablet  Commonly known as: LIPITOR     Claritin-D 24 Hour  MG per extended release tablet  Generic drug: loratadine-pseudoephedrine     fluticasone 50 MCG/ACT nasal spray  Commonly known as: FLONASE     losartan-hydroCHLOROthiazide 100-12.5 MG per tablet  Commonly known as: HYZAAR            ASK your doctor about these medications      Calcium Carb-Cholecalciferol 600-800 MG-UNIT Chew     esomeprazole Magnesium 20 MG Pack  Commonly known as: NEXIUM     vitamin D 25 MCG (1000 UT) Caps               Where to Get Your Medications        These medications were sent to [facility], [address] [place] [address] [facility],[place] [address] [phone]  [address] [place] [address], [address] [place] [address]      Phone: [phone]   hydrALAZINE 25 MG tablet           The morning of discharge, patient was up feeling well without any complaints of chest pain or shortness of breath. The patient will follow up with [facility] in 2-4 weeks. DISPOSITION: The patient is being discharged home in stable condition on a low saturated fat, low cholesterol and low salt diet. The patient is instructed to advance activities as tolerated to the limit of fatigue or shortness of breath. The patient is instructed to call the office or return to the ER for immediate evaluation for any shortness of breath or chest pain not relieved by NTG. Discharge Exam: BP (!) 149/45   Pulse 70   Temp 97.7 °F (36.5 °C) (Oral)   Resp 18   Wt 124 lb 3.2 oz (56.3 kg)   SpO2 96%   BMI 24.67 kg/m²   Patient has been seen by Dr. Tyrell Parikh: see his progress note for exam details.     Recent Results (from the past 24 hour(s))   CBC with Auto Differential    Collection Time: 11/23/22  1:31 PM   Result Value Ref Range    WBC 9.6 4.3 - 11.1 K/uL    RBC 3.87 (L) 4.05 - 5.2 M/uL    Hemoglobin 12.3 11.7 - 15.4 g/dL    Hematocrit 36.0 35.8 - 46.3 %    MCV 93.0 82 - 102 FL    MCH 31.8 26.1 - 32.9 PG    MCHC 34.2 31.4 - 35.0 g/dL    RDW 13.0 11.9 - 14.6 %    Platelets 567 203 - 487 K/uL    MPV 9.6 9.4 - 12.3 FL    nRBC 0.00 0.0 - 0.2 K/uL    Differential Type AUTOMATED      Seg Neutrophils 74 43 - 78 %    Lymphocytes 16 13 - 44 %    Monocytes 6 4.0 - 12.0 %    Eosinophils % 3 0.5 - 7.8 %    Basophils 1 0.0 - 2.0 %    Immature Granulocytes 0 0.0 - 5.0 %    Segs Absolute 7.1 1.7 - 8.2 K/UL    Absolute Lymph # 1.6 0.5 - 4.6 K/UL    Absolute Mono # 0.6 0.1 - 1.3 K/UL    Absolute Eos # 0.3 0.0 - 0.8 K/UL    Basophils Absolute 0.1 0.0 - 0.2 K/UL    Absolute Immature Granulocyte 0.0 0.0 - 0.5 K/UL   CMP    Collection Time: 11/23/22  1:31 PM   Result Value Ref Range    Sodium 136 133 - 143 mmol/L    Potassium 3.6 3.5 - 5.1 mmol/L    Chloride 104 101 - 110 mmol/L    CO2 24 21 - 32 mmol/L    Anion Gap 8 2 - 11 mmol/L    Glucose 104 (H) 65 - 100 mg/dL    BUN 17 8 - 23 MG/DL    Creatinine 1.10 (H) 0.6 - 1.0 MG/DL    Est, Glom Filt Rate 46 (L) >60 ml/min/1.73m2    Calcium 9.9 8.3 - 10.4 MG/DL    Total Bilirubin 0.5 0.2 - 1.1 MG/DL    ALT 22 12 - 65 U/L    AST 21 15 - 37 U/L    Alk Phosphatase 96 50 - 136 U/L    Total Protein 8.6 (H) 6.3 - 8.2 g/dL    Albumin 4.5 3.2 - 4.6 g/dL    Globulin 4.1 2.8 - 4.5 g/dL    Albumin/Globulin Ratio 1.1 0.4 - 1.6     Troponin    Collection Time: 11/23/22  1:31 PM   Result Value Ref Range    Troponin, High Sensitivity 137.4 (HH) 0 - 14 pg/mL   Troponin    Collection Time: 11/23/22  2:38 PM   Result Value Ref Range    Troponin, High Sensitivity 126.5 (HH) 0 - 14 pg/mL   Troponin    Collection Time: 11/23/22  6:40 PM   Result Value Ref Range    Troponin, High Sensitivity 154.0 (HH) 0 - 14 pg/mL   Troponin    Collection Time: 11/23/22  9:47 PM   Result Value Ref Range    Troponin, High Sensitivity 138.7 (HH) 0 - 14 pg/mL   Basic Metabolic Panel w/ Reflex to MG    Collection Time: 11/24/22  5:37 AM   Result Value Ref Range    Sodium 140 133 - 143 mmol/L    Potassium 3.9 3.5 - 5.1 mmol/L    Chloride 109 101 - 110 mmol/L    CO2 26 21 - 32 mmol/L    Anion Gap 5 2 - 11 mmol/L    Glucose 93 65 - 100 mg/dL    BUN 15 8 - 23 MG/DL    Creatinine 0.90 0.6 - 1.0 MG/DL    Est, Glom Filt Rate 59 (L) >60 ml/min/1.73m2    Calcium 9.4 8.3 - 10.4 MG/DL   CBC    Collection Time: 11/24/22 5: 37 AM   Result Value Ref Range    WBC 6.3 4.3 - 11.1 K/uL    RBC 3.46 (L) 4.05 - 5.2 M/uL    Hemoglobin 10.7 (L) 11.7 - 15.4 g/dL    Hematocrit 32.8 (L) 35.8 - 46.3 %    MCV 94.8 82 - 102 FL    MCH 30.9 26.1 - 32.9 PG    MCHC 32.6 31.4 - 35.0 g/dL    RDW 13.2 11.9 - 14.6 %    Platelets 624 720 - 322 K/uL    MPV 10.0 9.4 - 12.3 FL    nRBC 0.00 0.0 - 0.2 K/uL   EKG 12 lead    Collection Time: 11/24/22  8:11 AM   Result Value Ref Range    Ventricular Rate 65 BPM    Atrial Rate 65 BPM    P-R Interval 214 ms    QRS Duration 92 ms    Q-T Interval 432 ms    QTc Calculation (Bazett) 449 ms    P Axis 79 degrees    R Axis 39 degrees    T Axis 41 degrees    Diagnosis Sinus rhythm with 1st degree A-V block    Transthoracic echocardiogram (TTE) complete with contrast, bubble, strain, and 3D PRN    Collection Time: 11/24/22 12:20 PM   Result Value Ref Range    LA Minor Axis 5.4 cm    LA Major Axis 6.5 cm    LA Area 2C 20.2 cm2    LA Area 4C 21.9 cm2    LA Volume 2C 62 (A) 22 - 52 mL    LA Volume 4C 59 (A) 22 - 52 mL    LA Volume BP 66 (A) 22 - 52 mL    LA Diameter 3.9 cm    AR .0 ms    AR Max Velocity PISA 3.1 m/s    AV Peak Velocity 2.7 m/s    AV Peak Gradient 30 mmHg    AV Mean Velocity 1.7 m/s    AV Mean Velocity 1.7 m/s    AV Mean Gradient 14 mmHg    AV VTI 58.1 cm    AV Area by VTI 1.7 cm2    AV Area by Peak Velocity 1.5 cm2    Aortic Root 2.9 cm    Ascending Aorta 3.0 cm    EF 3D 67 %    LVOT SV 95.9 ml    LV EDV 3D 81 mL    LV ESV 3D 27 mL    LV Mass 3D 138.0 g    IVSd 1.2 (A) 0.6 - 0.9 cm    LVIDd 4.2 3.9 - 5.3 cm    LVIDs 2.6 cm    LVOT Diameter 1.8 cm    LVOT Mean Gradient 6 mmHg    LVOT VTI 37.7 cm    LVOT Peak Velocity 1.6 m/s    LVOT Peak Gradient 10 mmHg    LVPWd 1.2 (A) 0.6 - 0.9 cm    LV E' Lateral Velocity 10 cm/s    LV E' Septal Velocity 8 cm/s    LVOT Area 2.5 cm2    IVC Proxmal 1.6 cm    MV E Wave Deceleration Time 284.0 ms    MV A Velocity 1.35 m/s    MV E Velocity 1.31 m/s    MV Mean Gradient 4 mmHg MV VTI 45.6 cm    MV Mean Velocity 0.9 m/s    MV Max Velocity 1.6 m/s    MV Peak Gradient 10 mmHg    MV Area by VTI 2.1 cm2    RV Basal Dimension 3.5 cm    RV Free Wall Peak S' 14 cm/s    TAPSE 1.8 1.7 cm    TR Max Velocity 3.04 m/s    TR Peak Gradient 37 mmHg    Fractional Shortening 2D 38 28 - 44 %    LV RWT Ratio 0.57     LV Mass 2D 178.2 (A) 67 - 162 g    MV E/A 0.97     E/E' Ratio (Averaged) 14.74     E/E' Lateral 13.10     E/E' Septal 16.38     LA/AO Root Ratio 1.34     AV Velocity Ratio 0.59     LVOT:AV VTI Index 0.65     MV:LVOT VTI Index 1.21     Est. RA Pressure 3 mmHg    RVSP 40 mmHg         Patient Instructions:    Current Discharge Medication List        START taking these medications    Details   hydrALAZINE (APRESOLINE) 25 MG tablet Take 1 tablet by mouth 3 times daily as needed (Systolic pressure > 424 mm Hg)  Qty: 270 tablet, Refills: 3           CONTINUE these medications which have NOT CHANGED    Details   acetaminophen (TYLENOL) 325 MG tablet Take 650 mg by mouth every 4 hours as needed      albuterol sulfate  (90 Base) MCG/ACT inhaler Inhale 2 puffs into the lungs      amLODIPine-atorvastatatin (CADUET) 10-10 MG per tablet Take 1 tablet by mouth daily      atorvastatin (LIPITOR) 20 MG tablet Take 20 mg by mouth daily      Calcium Carb-Cholecalciferol 600-800 MG-UNIT CHEW Take 500 mg by mouth daily      vitamin D 25 MCG (1000 UT) CAPS Take 1,000 Units by mouth daily      esomeprazole Magnesium (NEXIUM) 20 MG PACK Take 20 mg by mouth daily      fluticasone (FLONASE) 50 MCG/ACT nasal spray 2 sprays by Nasal route daily      loratadine-pseudoephedrine (CLARITIN-D 24 HOUR)  MG per extended release tablet Take 1 tablet by mouth daily      losartan-hydroCHLOROthiazide (HYZAAR) 100-12.5 MG per tablet Take 1 tablet by mouth daily

## 2022-11-24 NOTE — PROGRESS NOTES
Discharge instructions reviewed with patient. Prescriptions given for hydralazine and med info sheets provided for all new medications. Opportunity for questions provided. Patient voiced understanding of all discharge instructions.

## 2022-11-28 ENCOUNTER — TELEPHONE (OUTPATIENT)
Dept: CARDIOLOGY CLINIC | Age: 87
End: 2022-11-28

## 2022-11-28 NOTE — TELEPHONE ENCOUNTER
Patient needs 2-4 week follow up. I called patient and left message. Please schedule with any provider.

## 2022-11-28 NOTE — TELEPHONE ENCOUNTER
----- Message from Miller Crabtree sent at 11/28/2022  8:15 AM EST -----  Regarding: FW: follow up    ----- Message -----  From: PHOENIX Gold CNP  Sent: 11/23/2022  10:29 PM EST  To: , #  Subject: follow up                                        Pt needs follow up 2-4 weeks, CP/HTN urgency.   New to practice can see whoever

## 2022-11-29 ENCOUNTER — INITIAL CONSULT (OUTPATIENT)
Dept: CARDIOLOGY CLINIC | Age: 87
End: 2022-11-29
Payer: MEDICARE

## 2022-11-29 VITALS
HEIGHT: 60 IN | HEART RATE: 60 BPM | SYSTOLIC BLOOD PRESSURE: 150 MMHG | WEIGHT: 126.7 LBS | BODY MASS INDEX: 24.87 KG/M2 | DIASTOLIC BLOOD PRESSURE: 50 MMHG

## 2022-11-29 DIAGNOSIS — R07.89 MUSCULOSKELETAL CHEST PAIN: Primary | ICD-10-CM

## 2022-11-29 PROCEDURE — G8417 CALC BMI ABV UP PARAM F/U: HCPCS | Performed by: INTERNAL MEDICINE

## 2022-11-29 PROCEDURE — G8427 DOCREV CUR MEDS BY ELIG CLIN: HCPCS | Performed by: INTERNAL MEDICINE

## 2022-11-29 PROCEDURE — 1036F TOBACCO NON-USER: CPT | Performed by: INTERNAL MEDICINE

## 2022-11-29 PROCEDURE — G8484 FLU IMMUNIZE NO ADMIN: HCPCS | Performed by: INTERNAL MEDICINE

## 2022-11-29 PROCEDURE — 1090F PRES/ABSN URINE INCON ASSESS: CPT | Performed by: INTERNAL MEDICINE

## 2022-11-29 PROCEDURE — 1123F ACP DISCUSS/DSCN MKR DOCD: CPT | Performed by: INTERNAL MEDICINE

## 2022-11-29 PROCEDURE — 99214 OFFICE O/P EST MOD 30 MIN: CPT | Performed by: INTERNAL MEDICINE

## 2022-11-29 RX ORDER — LOSARTAN POTASSIUM 100 MG/1
TABLET ORAL
COMMUNITY
Start: 2022-11-07

## 2022-11-29 RX ORDER — FUROSEMIDE 20 MG/1
TABLET ORAL
COMMUNITY
Start: 2022-03-14

## 2022-11-29 RX ORDER — AMLODIPINE BESYLATE 10 MG/1
TABLET ORAL
COMMUNITY
Start: 2022-11-07

## 2022-11-29 NOTE — PROGRESS NOTES
[facility] CARDIOLOGY  [address], [address]  PHONE: [phone]        22        NAME:  Elgin Puente  : 1927  MRN: [id number]     CHIEF COMPLAINT:    Chest Pain         SUBJECTIVE:     Recent ER visit for chest pain. MSK in nature. Better now. Medications were all reviewed with the patient today and updated as necessary. Current Outpatient Medications   Medication Sig    amLODIPine (NORVASC) 10 MG tablet TAKE 1 TABLET BY MOUTH EVERY MORNING FOR BLOOD PRESSURE    furosemide (LASIX) 20 MG tablet furosemide 20 mg tablet    losartan (COZAAR) 100 MG tablet TAKE 1 TABLET BY MOUTH EVERY DAY FOR BLOOD PRESSURE    hydrALAZINE (APRESOLINE) 25 MG tablet Take 1 tablet by mouth 3 times daily as needed (Systolic pressure > 805 mm Hg)    acetaminophen (TYLENOL) 325 MG tablet Take 650 mg by mouth every 4 hours as needed    albuterol sulfate  (90 Base) MCG/ACT inhaler Inhale 2 puffs into the lungs    atorvastatin (LIPITOR) 20 MG tablet Take 20 mg by mouth daily    loratadine-pseudoephedrine (CLARITIN-D 24 HOUR)  MG per extended release tablet Take 1 tablet by mouth daily     No current facility-administered medications for this visit.         Allergies   Allergen Reactions    Latex Rash    Adhesive Tape Rash     Other reaction(s): Rash-Allergy  Prefers paper tape  Other reaction(s): Rash-A      Sulfa Antibiotics Rash     Other reaction(s): Rash-Allergy    Diphenhydramine     Guaifenesin Other (See Comments)     Makes me climb the walls    Penicillamine     Sulfabenzamide     Aspirin Palpitations    Iodine Rash    Niacin Rash    Penicillins Palpitations           PHYSICAL EXAM:     Wt Readings from Last 3 Encounters:   22 126 lb 11.2 oz (57.5 kg)   22 124 lb 3.2 oz (56.3 kg)     BP Readings from Last 3 Encounters:   22 (!) 150/50   22 (!) 149/45       BP (!) 150/50   Pulse 60   Ht 4' 11.5\" (1.511 m)   Wt 126 lb 11.2 oz (57.5 kg) BMI 25.16 kg/m²     Physical Exam  Vitals reviewed. HENT:      Head: Normocephalic and atraumatic. Eyes:      Extraocular Movements: Extraocular movements intact. Pupils: Pupils are equal, round, and reactive to light. Cardiovascular:      Rate and Rhythm: Normal rate. Heart sounds: Murmur heard. Pulmonary:      Effort: Pulmonary effort is normal.      Breath sounds: Normal breath sounds. Abdominal:      General: Abdomen is flat. Palpations: Abdomen is soft. There is no mass. Musculoskeletal:         General: Normal range of motion. Cervical back: Normal range of motion. Skin:     General: Skin is warm and dry. Neurological:      General: No focal deficit present. Mental Status: She is alert and oriented to person, place, and time. Psychiatric:         Mood and Affect: Mood normal.         RECENT LABS AND RECORDS REVIEW    Echo: Mild AS, MR      ASSESSMENT and PLAN    Randee Park was seen today for chest pain. Diagnoses and all orders for this visit:    Musculoskeletal chest pain     Resolved      Return if symptoms worsen or fail to improve.        Jose Camacho MD  11/29/2022  1:14 PM

## 2023-07-10 ENCOUNTER — APPOINTMENT (OUTPATIENT)
Dept: CT IMAGING | Age: 88
DRG: 312 | End: 2023-07-10
Payer: MEDICARE

## 2023-07-10 ENCOUNTER — HOSPITAL ENCOUNTER (INPATIENT)
Age: 88
LOS: 3 days | Discharge: HOME | DRG: 312 | End: 2023-07-13
Attending: EMERGENCY MEDICINE | Admitting: STUDENT IN AN ORGANIZED HEALTH CARE EDUCATION/TRAINING PROGRAM
Payer: MEDICARE

## 2023-07-10 ENCOUNTER — APPOINTMENT (OUTPATIENT)
Dept: GENERAL RADIOLOGY | Age: 88
DRG: 312 | End: 2023-07-10
Payer: MEDICARE

## 2023-07-10 DIAGNOSIS — I35.0 NONRHEUMATIC AORTIC VALVE STENOSIS: ICD-10-CM

## 2023-07-10 DIAGNOSIS — I21.4 NSTEMI (NON-ST ELEVATED MYOCARDIAL INFARCTION) (HCC): ICD-10-CM

## 2023-07-10 DIAGNOSIS — R77.8 ELEVATED TROPONIN: ICD-10-CM

## 2023-07-10 DIAGNOSIS — S12.111A CLOSED ODONTOID FRACTURE WITH TYPE II MORPHOLOGY AND POSTERIOR DISPLACEMENT, INITIAL ENCOUNTER (HCC): ICD-10-CM

## 2023-07-10 DIAGNOSIS — R55 SYNCOPE AND COLLAPSE: Primary | ICD-10-CM

## 2023-07-10 PROBLEM — I34.0 NONRHEUMATIC MITRAL VALVE REGURGITATION: Status: ACTIVE | Noted: 2023-07-10

## 2023-07-10 PROBLEM — E78.2 MIXED HYPERLIPIDEMIA: Status: ACTIVE | Noted: 2023-07-10

## 2023-07-10 PROBLEM — E87.1 HYPONATREMIA: Status: ACTIVE | Noted: 2023-07-10

## 2023-07-10 LAB
ALBUMIN SERPL-MCNC: 4.6 G/DL (ref 3.2–4.6)
ALBUMIN/GLOB SERPL: 1.1 (ref 0.4–1.6)
ALP SERPL-CCNC: 119 U/L (ref 50–136)
ALT SERPL-CCNC: 22 U/L (ref 12–65)
ANION GAP SERPL CALC-SCNC: 8 MMOL/L (ref 2–11)
AST SERPL-CCNC: 27 U/L (ref 15–37)
BASOPHILS # BLD: 0.1 K/UL (ref 0–0.2)
BASOPHILS NFR BLD: 1 % (ref 0–2)
BILIRUB SERPL-MCNC: 0.4 MG/DL (ref 0.2–1.1)
BUN SERPL-MCNC: 25 MG/DL (ref 8–23)
CALCIUM SERPL-MCNC: 10.1 MG/DL (ref 8.3–10.4)
CHLORIDE SERPL-SCNC: 100 MMOL/L (ref 101–110)
CK SERPL-CCNC: 339 U/L (ref 21–215)
CO2 SERPL-SCNC: 23 MMOL/L (ref 21–32)
CREAT SERPL-MCNC: 1.3 MG/DL (ref 0.6–1)
DIFFERENTIAL METHOD BLD: ABNORMAL
EKG ATRIAL RATE: 80 BPM
EKG DIAGNOSIS: NORMAL
EKG P AXIS: 91 DEGREES
EKG P-R INTERVAL: 202 MS
EKG Q-T INTERVAL: 366 MS
EKG QRS DURATION: 88 MS
EKG QTC CALCULATION (BAZETT): 422 MS
EKG R AXIS: 49 DEGREES
EKG T AXIS: 48 DEGREES
EKG VENTRICULAR RATE: 80 BPM
EOSINOPHIL # BLD: 0.2 K/UL (ref 0–0.8)
EOSINOPHIL NFR BLD: 2 % (ref 0.5–7.8)
ERYTHROCYTE [DISTWIDTH] IN BLOOD BY AUTOMATED COUNT: 12.4 % (ref 11.9–14.6)
GLOBULIN SER CALC-MCNC: 4.1 G/DL (ref 2.8–4.5)
GLUCOSE SERPL-MCNC: 124 MG/DL (ref 65–100)
HCT VFR BLD AUTO: 35.5 % (ref 35.8–46.3)
HGB BLD-MCNC: 12.2 G/DL (ref 11.7–15.4)
IMM GRANULOCYTES # BLD AUTO: 0 K/UL (ref 0–0.5)
IMM GRANULOCYTES NFR BLD AUTO: 0 % (ref 0–5)
LYMPHOCYTES # BLD: 1.7 K/UL (ref 0.5–4.6)
LYMPHOCYTES NFR BLD: 15 % (ref 13–44)
MCH RBC QN AUTO: 31 PG (ref 26.1–32.9)
MCHC RBC AUTO-ENTMCNC: 34.4 G/DL (ref 31.4–35)
MCV RBC AUTO: 90.3 FL (ref 82–102)
MONOCYTES # BLD: 0.7 K/UL (ref 0.1–1.3)
MONOCYTES NFR BLD: 6 % (ref 4–12)
NEUTS SEG # BLD: 8.5 K/UL (ref 1.7–8.2)
NEUTS SEG NFR BLD: 76 % (ref 43–78)
NRBC # BLD: 0 K/UL (ref 0–0.2)
PLATELET # BLD AUTO: 272 K/UL (ref 150–450)
PMV BLD AUTO: 9.8 FL (ref 9.4–12.3)
POTASSIUM SERPL-SCNC: 4.1 MMOL/L (ref 3.5–5.1)
PROT SERPL-MCNC: 8.7 G/DL (ref 6.3–8.2)
RBC # BLD AUTO: 3.93 M/UL (ref 4.05–5.2)
SODIUM SERPL-SCNC: 131 MMOL/L (ref 133–143)
TROPONIN I SERPL HS-MCNC: 136.5 PG/ML (ref 0–14)
WBC # BLD AUTO: 11.2 K/UL (ref 4.3–11.1)

## 2023-07-10 PROCEDURE — 6370000000 HC RX 637 (ALT 250 FOR IP): Performed by: EMERGENCY MEDICINE

## 2023-07-10 PROCEDURE — 71045 X-RAY EXAM CHEST 1 VIEW: CPT

## 2023-07-10 PROCEDURE — 93010 ELECTROCARDIOGRAM REPORT: CPT | Performed by: INTERNAL MEDICINE

## 2023-07-10 PROCEDURE — 2580000003 HC RX 258: Performed by: STUDENT IN AN ORGANIZED HEALTH CARE EDUCATION/TRAINING PROGRAM

## 2023-07-10 PROCEDURE — 72125 CT NECK SPINE W/O DYE: CPT

## 2023-07-10 PROCEDURE — 84484 ASSAY OF TROPONIN QUANT: CPT

## 2023-07-10 PROCEDURE — 80053 COMPREHEN METABOLIC PANEL: CPT

## 2023-07-10 PROCEDURE — 82550 ASSAY OF CK (CPK): CPT

## 2023-07-10 PROCEDURE — 85025 COMPLETE CBC W/AUTO DIFF WBC: CPT

## 2023-07-10 PROCEDURE — 93005 ELECTROCARDIOGRAM TRACING: CPT | Performed by: EMERGENCY MEDICINE

## 2023-07-10 PROCEDURE — 70450 CT HEAD/BRAIN W/O DYE: CPT

## 2023-07-10 PROCEDURE — 99223 1ST HOSP IP/OBS HIGH 75: CPT | Performed by: INTERNAL MEDICINE

## 2023-07-10 PROCEDURE — 6360000002 HC RX W HCPCS: Performed by: EMERGENCY MEDICINE

## 2023-07-10 PROCEDURE — 81003 URINALYSIS AUTO W/O SCOPE: CPT

## 2023-07-10 PROCEDURE — 99285 EMERGENCY DEPT VISIT HI MDM: CPT

## 2023-07-10 PROCEDURE — 2140000000 HC CCU INTERMEDIATE R&B

## 2023-07-10 RX ORDER — ALBUTEROL SULFATE 90 UG/1
2 AEROSOL, METERED RESPIRATORY (INHALATION) EVERY 6 HOURS PRN
Status: DISCONTINUED | OUTPATIENT
Start: 2023-07-10 | End: 2023-07-13 | Stop reason: HOSPADM

## 2023-07-10 RX ORDER — SODIUM CHLORIDE 9 MG/ML
INJECTION, SOLUTION INTRAVENOUS PRN
Status: DISCONTINUED | OUTPATIENT
Start: 2023-07-10 | End: 2023-07-13 | Stop reason: HOSPADM

## 2023-07-10 RX ORDER — HYDRALAZINE HYDROCHLORIDE 25 MG/1
25 TABLET, FILM COATED ORAL 3 TIMES DAILY PRN
Status: DISCONTINUED | OUTPATIENT
Start: 2023-07-10 | End: 2023-07-13 | Stop reason: HOSPADM

## 2023-07-10 RX ORDER — SODIUM CHLORIDE 9 MG/ML
INJECTION, SOLUTION INTRAVENOUS CONTINUOUS
Status: DISCONTINUED | OUTPATIENT
Start: 2023-07-10 | End: 2023-07-12

## 2023-07-10 RX ORDER — SODIUM CHLORIDE 0.9 % (FLUSH) 0.9 %
5-40 SYRINGE (ML) INJECTION EVERY 12 HOURS SCHEDULED
Status: DISCONTINUED | OUTPATIENT
Start: 2023-07-10 | End: 2023-07-13 | Stop reason: HOSPADM

## 2023-07-10 RX ORDER — ATORVASTATIN CALCIUM 20 MG/1
20 TABLET, FILM COATED ORAL DAILY
Status: DISCONTINUED | OUTPATIENT
Start: 2023-07-11 | End: 2023-07-13 | Stop reason: HOSPADM

## 2023-07-10 RX ORDER — CETIRIZINE HYDROCHLORIDE 10 MG/1
5 TABLET ORAL DAILY
Status: DISCONTINUED | OUTPATIENT
Start: 2023-07-11 | End: 2023-07-13 | Stop reason: HOSPADM

## 2023-07-10 RX ORDER — ONDANSETRON 4 MG/1
4 TABLET, ORALLY DISINTEGRATING ORAL EVERY 8 HOURS PRN
Status: DISCONTINUED | OUTPATIENT
Start: 2023-07-10 | End: 2023-07-13 | Stop reason: HOSPADM

## 2023-07-10 RX ORDER — ACETAMINOPHEN 160 MG/5ML
650 SUSPENSION ORAL
Status: COMPLETED | OUTPATIENT
Start: 2023-07-10 | End: 2023-07-10

## 2023-07-10 RX ORDER — MORPHINE SULFATE 2 MG/ML
2 INJECTION, SOLUTION INTRAMUSCULAR; INTRAVENOUS
Status: COMPLETED | OUTPATIENT
Start: 2023-07-10 | End: 2023-07-10

## 2023-07-10 RX ORDER — SODIUM CHLORIDE 0.9 % (FLUSH) 0.9 %
5-40 SYRINGE (ML) INJECTION PRN
Status: DISCONTINUED | OUTPATIENT
Start: 2023-07-10 | End: 2023-07-13 | Stop reason: HOSPADM

## 2023-07-10 RX ORDER — POLYETHYLENE GLYCOL 3350 17 G/17G
17 POWDER, FOR SOLUTION ORAL DAILY PRN
Status: DISCONTINUED | OUTPATIENT
Start: 2023-07-10 | End: 2023-07-13 | Stop reason: HOSPADM

## 2023-07-10 RX ORDER — AMLODIPINE BESYLATE 10 MG/1
10 TABLET ORAL DAILY
Status: DISCONTINUED | OUTPATIENT
Start: 2023-07-11 | End: 2023-07-13 | Stop reason: HOSPADM

## 2023-07-10 RX ORDER — ONDANSETRON 2 MG/ML
4 INJECTION INTRAMUSCULAR; INTRAVENOUS EVERY 6 HOURS PRN
Status: DISCONTINUED | OUTPATIENT
Start: 2023-07-10 | End: 2023-07-13 | Stop reason: HOSPADM

## 2023-07-10 RX ORDER — ACETAMINOPHEN 325 MG/1
650 TABLET ORAL EVERY 6 HOURS PRN
Status: DISCONTINUED | OUTPATIENT
Start: 2023-07-10 | End: 2023-07-13 | Stop reason: HOSPADM

## 2023-07-10 RX ADMIN — SODIUM CHLORIDE: 9 INJECTION, SOLUTION INTRAVENOUS at 21:24

## 2023-07-10 RX ADMIN — SODIUM CHLORIDE, PRESERVATIVE FREE 5 ML: 5 INJECTION INTRAVENOUS at 23:12

## 2023-07-10 RX ADMIN — ACETAMINOPHEN 650 MG: 325 SUSPENSION ORAL at 17:24

## 2023-07-10 RX ADMIN — MORPHINE SULFATE 2 MG: 2 INJECTION, SOLUTION INTRAMUSCULAR; INTRAVENOUS at 21:23

## 2023-07-10 ASSESSMENT — ENCOUNTER SYMPTOMS
HEMATEMESIS: 0
HEMOPTYSIS: 0
BLOOD IN STOOL: 0
NAUSEA: 0
ABDOMINAL PAIN: 0
HEMATOCHEZIA: 0
DOUBLE VISION: 0
WHEEZING: 0
COLOR CHANGE: 0
EYE REDNESS: 0
SHORTNESS OF BREATH: 0
STRIDOR: 0
PHOTOPHOBIA: 0
EYE PAIN: 0
HOARSE VOICE: 0

## 2023-07-10 ASSESSMENT — LIFESTYLE VARIABLES
HOW OFTEN DO YOU HAVE A DRINK CONTAINING ALCOHOL: NEVER
HOW MANY STANDARD DRINKS CONTAINING ALCOHOL DO YOU HAVE ON A TYPICAL DAY: PATIENT DOES NOT DRINK

## 2023-07-10 ASSESSMENT — PAIN SCALES - GENERAL: PAINLEVEL_OUTOF10: 6

## 2023-07-10 NOTE — H&P
Albumin 4.6 3.2 - 4.6 g/dL    Globulin 4.1 2.8 - 4.5 g/dL    Albumin/Globulin Ratio 1.1 0.4 - 1.6          [unfilled]     Imaging:   [unfilled]     Physical Exam  Physical Exam  Constitutional:       General: She is not in acute distress. Appearance: She is not toxic-appearing. Neck:      Comments: Aspen collar in place. Intact distal neuro-vasculature. Pulmonary:      Breath sounds: No wheezing or rhonchi. Abdominal:      General: There is no distension. Tenderness: There is no abdominal tenderness. There is no guarding or rebound. Musculoskeletal:      Right lower leg: No edema. Left lower leg: No edema. Skin:     Coloration: Skin is not jaundiced or pale. Neurological:      General: No focal deficit present. Mental Status: She is oriented to person, place, and time. Assessment:    Elba Xie is a 80 y.o. female with a past medical history significant for hypertension,non-rheumatic mild AS, mild /moderate MS who presents today with after a fall in the morning. Plan: 1. Cervical fracture  C-spine CT showed :  Acute type II dens fracture with 2 mm posterior subluxation of C1 relative to C2. There is associated epidural and prevertebral hematoma without high-grade spinal canal stenosis. Frequent neuro-checks, aspen collar in place for stabilization. Neurosurgery consult. 2.Syncope/near syncope  Without warning signs concerning for cardiac etiology, non-contrast head CT showed no acute process. TTE in 11/22 showed non-rheumatic mild AS, mild /moderate MS  Troponin elevation appears chronic, no new EKG changes. Monitor on telemetry, trend troponin, cardiology on board and recommendation appreciated. 3.Elevated troponin  Likely from demand ischemia  Trend Tn. 4.Hyponatremia. Mild with serum sodium of 131, possibly from hypovolemic  NS infusion and monitor serum sodium. 5.PLACIDO  BUN/cR =25/1.3 from 15/0.9 in 11/22.   IVF, avoid nephrotoxins,monitor

## 2023-07-10 NOTE — ED NOTES
[place] collar applied.  Pt presents with even and unlabored respirations     Galdino Canales RN  07/10/23 [id number]

## 2023-07-10 NOTE — ED PROVIDER NOTES
reconstruction. FINDINGS:  Intracranial contents:    Generalized parenchymal volume loss without lobar predominance. No   hydrocephalus. Patchy regions of hypoattenuation within periventricular and   peripheral white matter most commonly represent chronic microangiopathy. There   is no midline shift or mass effect. No hemorrhage, mass lesion, or evidence of   evolving large territorial infarct. Atherosclerosis. Bones and extracranial soft tissues:    Normal calvarium. Bilateral scleral bands noted. The visualized paranasal   sinuses are clear. No mastoid or middle ear effusions. Impression    1. No acute intracranial abnormality. 2.  Age commensurate parenchymal volume loss. Mild chronic microvascular   ischemic changes. Atherosclerosis.      Elgin Baldwin M.D.   7/10/2023 6:22:00 PM   CBC with Auto Differential   Result Value Ref Range    WBC 11.2 (H) 4.3 - 11.1 K/uL    RBC 3.93 (L) 4.05 - 5.2 M/uL    Hemoglobin 12.2 11.7 - 15.4 g/dL    Hematocrit 35.5 (L) 35.8 - 46.3 %    MCV 90.3 82 - 102 FL    MCH 31.0 26.1 - 32.9 PG    MCHC 34.4 31.4 - 35.0 g/dL    RDW 12.4 11.9 - 14.6 %    Platelets 111 923 - 993 K/uL    MPV 9.8 9.4 - 12.3 FL    nRBC 0.00 0.0 - 0.2 K/uL    Differential Type AUTOMATED      Neutrophils % 76 43 - 78 %    Lymphocytes % 15 13 - 44 %    Monocytes % 6 4.0 - 12.0 %    Eosinophils % 2 0.5 - 7.8 %    Basophils % 1 0.0 - 2.0 %    Immature Granulocytes 0 0.0 - 5.0 %    Neutrophils Absolute 8.5 (H) 1.7 - 8.2 K/UL    Lymphocytes Absolute 1.7 0.5 - 4.6 K/UL    Monocytes Absolute 0.7 0.1 - 1.3 K/UL    Eosinophils Absolute 0.2 0.0 - 0.8 K/UL    Basophils Absolute 0.1 0.0 - 0.2 K/UL    Absolute Immature Granulocyte 0.0 0.0 - 0.5 K/UL   Troponin   Result Value Ref Range    Troponin, High Sensitivity 136.5 (HH) 0 - 14 pg/mL   CMP   Result Value Ref Range    Sodium 131 (L) 133 - 143 mmol/L    Potassium 4.1 3.5 - 5.1 mmol/L    Chloride 100 (L) 101 - 110 mmol/L    CO2 23 21 - 32 mmol/L

## 2023-07-10 NOTE — ED TRIAGE NOTES
Pt to ED in wheelchair with family. Pt states she was sitting in chair when everything went black and she fell into floor. Pt states she fell face first and hit her head. Pt denies blood thinner or LOC. Pt denies headache. Pt c/o neck pain. Pt denies chest pain, dizziness, NVD. States SOB at baseline.

## 2023-07-11 ENCOUNTER — APPOINTMENT (OUTPATIENT)
Dept: MRI IMAGING | Age: 88
DRG: 312 | End: 2023-07-11
Payer: MEDICARE

## 2023-07-11 ENCOUNTER — APPOINTMENT (OUTPATIENT)
Dept: NON INVASIVE DIAGNOSTICS | Age: 88
DRG: 312 | End: 2023-07-11
Attending: INTERNAL MEDICINE
Payer: MEDICARE

## 2023-07-11 LAB
ANION GAP SERPL CALC-SCNC: 5 MMOL/L (ref 2–11)
BASOPHILS # BLD: 0.1 K/UL (ref 0–0.2)
BASOPHILS NFR BLD: 1 % (ref 0–2)
BUN SERPL-MCNC: 19 MG/DL (ref 8–23)
CALCIUM SERPL-MCNC: 9.3 MG/DL (ref 8.3–10.4)
CHLORIDE SERPL-SCNC: 104 MMOL/L (ref 101–110)
CO2 SERPL-SCNC: 27 MMOL/L (ref 21–32)
CREAT SERPL-MCNC: 1 MG/DL (ref 0.6–1)
DIFFERENTIAL METHOD BLD: ABNORMAL
ECHO AO ASC DIAM: 3.1 CM
ECHO AO ASCENDING AORTA INDEX: 2.07 CM/M2
ECHO AO ROOT DIAM: 2.9 CM
ECHO AO ROOT INDEX: 1.93 CM/M2
ECHO AR MAX VEL PISA: 3.4 M/S
ECHO AV AREA PEAK VELOCITY: 1.4 CM2
ECHO AV AREA VTI: 1.5 CM2
ECHO AV AREA/BSA PEAK VELOCITY: 0.9 CM2/M2
ECHO AV AREA/BSA VTI: 1 CM2/M2
ECHO AV MEAN GRADIENT: 17 MMHG
ECHO AV MEAN VELOCITY: 1.9 M/S
ECHO AV PEAK GRADIENT: 33 MMHG
ECHO AV PEAK VELOCITY: 2.9 M/S
ECHO AV REGURGITANT PHT: 493 MS
ECHO AV VELOCITY RATIO: 0.55
ECHO AV VTI: 68.1 CM
ECHO BSA: 1.52 M2
ECHO EST RA PRESSURE: 3 MMHG
ECHO IVC PROX: 1.6 CM
ECHO LA AREA 2C: 24.1 CM2
ECHO LA AREA 4C: 21.7 CM2
ECHO LA DIAMETER INDEX: 2.27 CM/M2
ECHO LA DIAMETER: 3.4 CM
ECHO LA MAJOR AXIS: 6.4 CM
ECHO LA MINOR AXIS: 6.3 CM
ECHO LA TO AORTIC ROOT RATIO: 1.17
ECHO LA VOL 2C: 75 ML (ref 22–52)
ECHO LA VOL 4C: 58 ML (ref 22–52)
ECHO LA VOL BP: 66 ML (ref 22–52)
ECHO LA VOL/BSA BIPLANE: 44 ML/M2 (ref 16–34)
ECHO LA VOLUME INDEX A2C: 50 ML/M2 (ref 16–34)
ECHO LA VOLUME INDEX A4C: 39 ML/M2 (ref 16–34)
ECHO LV E' LATERAL VELOCITY: 11 CM/S
ECHO LV E' SEPTAL VELOCITY: 7 CM/S
ECHO LV EDV 3D: 90 ML
ECHO LV EDV INDEX 3D: 60 ML/M2
ECHO LV EJECTION FRACTION 3D: 66 %
ECHO LV ESV 3D: 31 ML
ECHO LV ESV INDEX 3D: 21 ML/M2
ECHO LV FRACTIONAL SHORTENING: 33 % (ref 28–44)
ECHO LV INTERNAL DIMENSION DIASTOLE INDEX: 2.6 CM/M2
ECHO LV INTERNAL DIMENSION DIASTOLIC: 3.9 CM (ref 3.9–5.3)
ECHO LV INTERNAL DIMENSION SYSTOLIC INDEX: 1.73 CM/M2
ECHO LV INTERNAL DIMENSION SYSTOLIC: 2.6 CM
ECHO LV IVSD: 1.4 CM (ref 0.6–0.9)
ECHO LV MASS 2D: 190.4 G (ref 67–162)
ECHO LV MASS 3D INDEX: 74.7 G/M2
ECHO LV MASS 3D: 112 G
ECHO LV MASS INDEX 2D: 127 G/M2 (ref 43–95)
ECHO LV POSTERIOR WALL DIASTOLIC: 1.3 CM (ref 0.6–0.9)
ECHO LV RELATIVE WALL THICKNESS RATIO: 0.67
ECHO LVOT AREA: 2.5 CM2
ECHO LVOT AV VTI INDEX: 0.6
ECHO LVOT DIAM: 1.8 CM
ECHO LVOT MEAN GRADIENT: 5 MMHG
ECHO LVOT PEAK GRADIENT: 10 MMHG
ECHO LVOT PEAK VELOCITY: 1.6 M/S
ECHO LVOT STROKE VOLUME INDEX: 68.8 ML/M2
ECHO LVOT SV: 103.3 ML
ECHO LVOT VTI: 40.6 CM
ECHO MV A VELOCITY: 1.12 M/S
ECHO MV AREA VTI: 1.7 CM2
ECHO MV E DECELERATION TIME (DT): 162 MS
ECHO MV E VELOCITY: 1.62 M/S
ECHO MV E/A RATIO: 1.45
ECHO MV E/E' LATERAL: 14.73
ECHO MV E/E' RATIO (AVERAGED): 18.94
ECHO MV E/E' SEPTAL: 23.14
ECHO MV EROA PISA: 0.1 CM2
ECHO MV LVOT VTI INDEX: 1.47
ECHO MV MAX VELOCITY: 2 M/S
ECHO MV MEAN GRADIENT: 4 MMHG
ECHO MV MEAN VELOCITY: 0.8 M/S
ECHO MV PEAK GRADIENT: 16 MMHG
ECHO MV REGURGITANT ALIASING (NYQUIST) VELOCITY: 38 CM/S
ECHO MV REGURGITANT PEAK GRADIENT: 154 MMHG
ECHO MV REGURGITANT PEAK VELOCITY: 6.2 M/S
ECHO MV REGURGITANT RADIUS PISA: 0.5 CM
ECHO MV REGURGITANT VOLUME PISA: 20.21 ML
ECHO MV REGURGITANT VTIA: 210 CM
ECHO MV VTI: 59.8 CM
ECHO PV ACCELERATION TIME (AT): 95 MS
ECHO PV MAX VELOCITY: 1.4 M/S
ECHO PV PEAK GRADIENT: 8 MMHG
ECHO RIGHT VENTRICULAR SYSTOLIC PRESSURE (RVSP): 50 MMHG
ECHO RV BASAL DIMENSION: 3.3 CM
ECHO RV FREE WALL PEAK S': 14 CM/S
ECHO RV TAPSE: 2.1 CM (ref 1.7–?)
ECHO TV REGURGITANT MAX VELOCITY: 3.44 M/S
ECHO TV REGURGITANT PEAK GRADIENT: 47 MMHG
EOSINOPHIL # BLD: 0.3 K/UL (ref 0–0.8)
EOSINOPHIL NFR BLD: 4 % (ref 0.5–7.8)
ERYTHROCYTE [DISTWIDTH] IN BLOOD BY AUTOMATED COUNT: 12.5 % (ref 11.9–14.6)
GLUCOSE SERPL-MCNC: 101 MG/DL (ref 65–100)
HCT VFR BLD AUTO: 32.8 % (ref 35.8–46.3)
HGB BLD-MCNC: 11.3 G/DL (ref 11.7–15.4)
IMM GRANULOCYTES # BLD AUTO: 0 K/UL (ref 0–0.5)
IMM GRANULOCYTES NFR BLD AUTO: 0 % (ref 0–5)
LYMPHOCYTES # BLD: 1.5 K/UL (ref 0.5–4.6)
LYMPHOCYTES NFR BLD: 19 % (ref 13–44)
MCH RBC QN AUTO: 31.3 PG (ref 26.1–32.9)
MCHC RBC AUTO-ENTMCNC: 34.5 G/DL (ref 31.4–35)
MCV RBC AUTO: 90.9 FL (ref 82–102)
MONOCYTES # BLD: 0.9 K/UL (ref 0.1–1.3)
MONOCYTES NFR BLD: 11 % (ref 4–12)
NEUTS SEG # BLD: 5.1 K/UL (ref 1.7–8.2)
NEUTS SEG NFR BLD: 65 % (ref 43–78)
NRBC # BLD: 0 K/UL (ref 0–0.2)
PLATELET # BLD AUTO: 238 K/UL (ref 150–450)
PMV BLD AUTO: 9.7 FL (ref 9.4–12.3)
POTASSIUM SERPL-SCNC: 4.2 MMOL/L (ref 3.5–5.1)
RBC # BLD AUTO: 3.61 M/UL (ref 4.05–5.2)
SODIUM SERPL-SCNC: 136 MMOL/L (ref 133–143)
WBC # BLD AUTO: 8 K/UL (ref 4.3–11.1)

## 2023-07-11 PROCEDURE — 6370000000 HC RX 637 (ALT 250 FOR IP): Performed by: STUDENT IN AN ORGANIZED HEALTH CARE EDUCATION/TRAINING PROGRAM

## 2023-07-11 PROCEDURE — 99221 1ST HOSP IP/OBS SF/LOW 40: CPT | Performed by: NURSE PRACTITIONER

## 2023-07-11 PROCEDURE — 85025 COMPLETE CBC W/AUTO DIFF WBC: CPT

## 2023-07-11 PROCEDURE — 2580000003 HC RX 258: Performed by: STUDENT IN AN ORGANIZED HEALTH CARE EDUCATION/TRAINING PROGRAM

## 2023-07-11 PROCEDURE — 2140000000 HC CCU INTERMEDIATE R&B

## 2023-07-11 PROCEDURE — 93306 TTE W/DOPPLER COMPLETE: CPT

## 2023-07-11 PROCEDURE — 72141 MRI NECK SPINE W/O DYE: CPT

## 2023-07-11 PROCEDURE — 36415 COLL VENOUS BLD VENIPUNCTURE: CPT

## 2023-07-11 PROCEDURE — 80048 BASIC METABOLIC PNL TOTAL CA: CPT

## 2023-07-11 PROCEDURE — 6360000002 HC RX W HCPCS: Performed by: STUDENT IN AN ORGANIZED HEALTH CARE EDUCATION/TRAINING PROGRAM

## 2023-07-11 RX ORDER — ESOMEPRAZOLE MAGNESIUM 20 MG/1
20 FOR SUSPENSION ORAL DAILY
COMMUNITY

## 2023-07-11 RX ORDER — LORAZEPAM 2 MG/ML
0.5 INJECTION INTRAMUSCULAR
Status: COMPLETED | OUTPATIENT
Start: 2023-07-11 | End: 2023-07-11

## 2023-07-11 RX ADMIN — ATORVASTATIN CALCIUM 20 MG: 20 TABLET, FILM COATED ORAL at 08:19

## 2023-07-11 RX ADMIN — AMLODIPINE BESYLATE 10 MG: 10 TABLET ORAL at 08:20

## 2023-07-11 RX ADMIN — LORAZEPAM 0.5 MG: 2 INJECTION INTRAMUSCULAR; INTRAVENOUS at 13:02

## 2023-07-11 RX ADMIN — CETIRIZINE HYDROCHLORIDE 5 MG: 10 TABLET, FILM COATED ORAL at 08:19

## 2023-07-11 RX ADMIN — SODIUM CHLORIDE, PRESERVATIVE FREE 10 ML: 5 INJECTION INTRAVENOUS at 20:15

## 2023-07-11 ASSESSMENT — PAIN SCALES - GENERAL: PAINLEVEL_OUTOF10: 0

## 2023-07-11 NOTE — ACP (ADVANCE CARE PLANNING)
Date of ACP Conversation: 7/11/2023     Conversation Conducted with: Patient with Decision Making Capacity    ACP Activator: Saige Marquez, [address] [place] [address] Decision Maker:  Patricio Rodgers  [phone]

## 2023-07-11 NOTE — ED NOTES
TRANSFER - OUT REPORT:    Verbal report given to Marin Hunter RN on Fransisco Or  being transferred to [phone] for routine progression of patient care       Report consisted of patient's Situation, Background, Assessment and   Recommendations(SBAR). Information from the following report(s) Nurse Handoff Report was reviewed with the receiving nurse. [place] Fall Assessment:    Presents to emergency department  because of falls (Syncope, seizure, or loss of consciousness): No  Age > 70: Yes  Altered Mental Status, Intoxication with alcohol or substance confusion (Disorientation, impaired judgment, poor safety awaremess, or inability to follow instructions): No  Impaired Mobility: Ambulates or transfers with assistive devices or assistance; Unable to ambulate or transer.: No  Nursing Judgement: No          Lines:   Peripheral IV 07/10/23 Right Forearm (Active)   Site Assessment Clean, dry & intact 07/10/23 1726   Line Status Blood return noted 07/10/23 1726   Phlebitis Assessment No symptoms 07/10/23 1726   Infiltration Assessment 0 07/10/23 1726   Dressing Status New dressing applied;Clean, dry & intact 07/10/23 1726   Dressing Type Transparent 07/10/23 1726   Dressing Intervention New 07/10/23 1726        Opportunity for questions and clarification was provided.       Patient transported with:  Registered Nurse          Misti Moeller RN  07/10/23 1617

## 2023-07-11 NOTE — CARE COORDINATION
Patient admitted with syncope and collapse. During the fall, pt sustained a C1 fracture. [place] collar in place. Neurosurgery consulted. No surgery planned. Cardiology consulted. PT/OT to evaluate. CM met pt's daughter/HCPOA, Nirmala Ignacio, who reports she's a single child. Patient sleeping. Nirmala Ignacio provided assessment. Pt lives alone in a house and is independent of all living. Nirmala Ignacio transports patient to appointments. DME: Cane, grab bars, and shower chair. Not in use: RW, BSC, No history of home health or SNF. Prescriptions are available through drug plan. CM will follow up therapy recommendations with Nirmala Ignacio and patient to plan discharge. 07/11/23 1431   Service Assessment   Patient Orientation Alert and Oriented   Cognition Alert   History Provided By Child/Family  (Only child, Nirmala Ignacio)   Primary Caregiver Self   Accompanied By/Relationship Kaiden Bauer Family Members   Patient's Healthcare Decision Maker is: Legal Next of Kin  (Named in [address] [place] [address],[address]. Asked for document to be brought in for scanning.)   PCP Verified by CM Yes   Last Visit to PCP Within last 3 months   Prior Functional Level Independent in ADLs/IADLs   Current Functional Level Assistance with the following:;Shopping;Housework;Cooking   Can patient return to prior living arrangement Yes   Ability to make needs known: Good   Family able to assist with home care needs: Yes   Would you like for me to discuss the discharge plan with any other family members/significant others, and if so, who? No   Financial Resources Medicare; Other (Comment)  (Supplement)   Community Resources None   Social/Functional History   Lives With Alone   Type of [address] [place] [address]  One level   Home Access Stairs to enter with rails   Entrance Stairs - Number of Steps 1/2 step entry   [address] chair;Grab bars in shower;3-in-1 commode   Home Equipment Cane;Walker, rolling;Grab bars   Receives Help From Family   ADL Assistance Independent   Homemaking

## 2023-07-12 PROCEDURE — 2580000003 HC RX 258: Performed by: STUDENT IN AN ORGANIZED HEALTH CARE EDUCATION/TRAINING PROGRAM

## 2023-07-12 PROCEDURE — 99231 SBSQ HOSP IP/OBS SF/LOW 25: CPT | Performed by: INTERNAL MEDICINE

## 2023-07-12 PROCEDURE — 97161 PT EVAL LOW COMPLEX 20 MIN: CPT

## 2023-07-12 PROCEDURE — 97530 THERAPEUTIC ACTIVITIES: CPT

## 2023-07-12 PROCEDURE — 6370000000 HC RX 637 (ALT 250 FOR IP): Performed by: STUDENT IN AN ORGANIZED HEALTH CARE EDUCATION/TRAINING PROGRAM

## 2023-07-12 PROCEDURE — 2140000000 HC CCU INTERMEDIATE R&B

## 2023-07-12 RX ADMIN — CETIRIZINE HYDROCHLORIDE 5 MG: 10 TABLET, FILM COATED ORAL at 08:55

## 2023-07-12 RX ADMIN — ATORVASTATIN CALCIUM 20 MG: 20 TABLET, FILM COATED ORAL at 08:55

## 2023-07-12 RX ADMIN — SODIUM CHLORIDE, PRESERVATIVE FREE 10 ML: 5 INJECTION INTRAVENOUS at 09:02

## 2023-07-12 RX ADMIN — ACETAMINOPHEN 650 MG: 325 TABLET ORAL at 09:00

## 2023-07-12 RX ADMIN — SODIUM CHLORIDE, PRESERVATIVE FREE 10 ML: 5 INJECTION INTRAVENOUS at 20:36

## 2023-07-12 RX ADMIN — SODIUM CHLORIDE: 9 INJECTION, SOLUTION INTRAVENOUS at 08:59

## 2023-07-12 RX ADMIN — AMLODIPINE BESYLATE 10 MG: 10 TABLET ORAL at 08:56

## 2023-07-12 ASSESSMENT — PAIN SCALES - GENERAL
PAINLEVEL_OUTOF10: 0

## 2023-07-12 NOTE — CARE COORDINATION
Patient's status discussed in IDR. LOS 2 D. Orthotist here to attempt to adjust [employer] for a better fit. Per Neurosurgery note, patient will need to wear collar indefinitely. PT/OT to evaluate. CM will follow up therapy recommendations with patient and her daughter.

## 2023-07-13 ENCOUNTER — TELEPHONE (OUTPATIENT)
Dept: NEUROSURGERY | Age: 88
End: 2023-07-13

## 2023-07-13 VITALS
RESPIRATION RATE: 22 BRPM | HEART RATE: 63 BPM | OXYGEN SATURATION: 96 % | HEIGHT: 59 IN | BODY MASS INDEX: 24.74 KG/M2 | TEMPERATURE: 97.6 F | DIASTOLIC BLOOD PRESSURE: 73 MMHG | SYSTOLIC BLOOD PRESSURE: 153 MMHG | WEIGHT: 122.7 LBS

## 2023-07-13 PROBLEM — R55 SYNCOPE AND COLLAPSE: Status: RESOLVED | Noted: 2023-07-10 | Resolved: 2023-07-13

## 2023-07-13 PROBLEM — E87.1 HYPONATREMIA: Status: RESOLVED | Noted: 2023-07-10 | Resolved: 2023-07-13

## 2023-07-13 LAB
BILIRUB UR QL: NEGATIVE
GLUCOSE UR QL STRIP.AUTO: NEGATIVE MG/DL
KETONES UR-MCNC: NEGATIVE MG/DL
LEUKOCYTE ESTERASE UR QL STRIP: NEGATIVE
NITRITE UR QL: NEGATIVE
PH UR: 7 (ref 5–9)
PROT UR QL: ABNORMAL MG/DL
RBC # UR STRIP: ABNORMAL
SERVICE CMNT-IMP: ABNORMAL
SP GR UR: 1.02 (ref 1–1.02)
UROBILINOGEN UR QL: 0.2 EU/DL (ref 0.2–1)

## 2023-07-13 PROCEDURE — 6370000000 HC RX 637 (ALT 250 FOR IP): Performed by: STUDENT IN AN ORGANIZED HEALTH CARE EDUCATION/TRAINING PROGRAM

## 2023-07-13 PROCEDURE — 94640 AIRWAY INHALATION TREATMENT: CPT

## 2023-07-13 PROCEDURE — 94664 DEMO&/EVAL PT USE INHALER: CPT

## 2023-07-13 PROCEDURE — 2580000003 HC RX 258: Performed by: STUDENT IN AN ORGANIZED HEALTH CARE EDUCATION/TRAINING PROGRAM

## 2023-07-13 RX ORDER — TRAMADOL HYDROCHLORIDE 50 MG/1
50 TABLET ORAL EVERY 6 HOURS PRN
Qty: 20 TABLET | Refills: 0 | Status: SHIPPED | OUTPATIENT
Start: 2023-07-13 | End: 2023-07-18

## 2023-07-13 RX ADMIN — ATORVASTATIN CALCIUM 20 MG: 20 TABLET, FILM COATED ORAL at 08:07

## 2023-07-13 RX ADMIN — CETIRIZINE HYDROCHLORIDE 5 MG: 10 TABLET, FILM COATED ORAL at 08:07

## 2023-07-13 RX ADMIN — SODIUM CHLORIDE, PRESERVATIVE FREE 10 ML: 5 INJECTION INTRAVENOUS at 08:11

## 2023-07-13 RX ADMIN — ALBUTEROL SULFATE 2 PUFF: 90 AEROSOL, METERED RESPIRATORY (INHALATION) at 04:22

## 2023-07-13 RX ADMIN — ACETAMINOPHEN 650 MG: 325 TABLET ORAL at 03:35

## 2023-07-13 RX ADMIN — AMLODIPINE BESYLATE 10 MG: 10 TABLET ORAL at 08:07

## 2023-07-13 RX ADMIN — ACETAMINOPHEN 650 MG: 325 TABLET ORAL at 08:07

## 2023-07-13 ASSESSMENT — PAIN DESCRIPTION - ORIENTATION: ORIENTATION: MID

## 2023-07-13 ASSESSMENT — PAIN SCALES - GENERAL
PAINLEVEL_OUTOF10: 0
PAINLEVEL_OUTOF10: 3
PAINLEVEL_OUTOF10: 3
PAINLEVEL_OUTOF10: 0

## 2023-07-13 ASSESSMENT — PAIN DESCRIPTION - LOCATION
LOCATION: HEAD;NECK
LOCATION: HEAD;NECK

## 2023-07-13 ASSESSMENT — PAIN - FUNCTIONAL ASSESSMENT
PAIN_FUNCTIONAL_ASSESSMENT: PREVENTS OR INTERFERES SOME ACTIVE ACTIVITIES AND ADLS
PAIN_FUNCTIONAL_ASSESSMENT: ACTIVITIES ARE NOT PREVENTED

## 2023-07-13 ASSESSMENT — PAIN DESCRIPTION - DESCRIPTORS: DESCRIPTORS: ACHING;SORE

## 2023-07-13 NOTE — PLAN OF CARE
Problem: Discharge Planning  Goal: Discharge to home or other facility with appropriate resources  Outcome: Adequate for Discharge  Flowsheets  Taken 7/13/2023 0435 by Jordy Armenta RN  Discharge to home or other facility with appropriate resources:   Identify barriers to discharge with patient and caregiver   Arrange for needed discharge resources and transportation as appropriate  Taken 7/13/2023 0000 by Jordy Armenta RN  Discharge to home or other facility with appropriate resources:   Identify barriers to discharge with patient and caregiver   Arrange for needed discharge resources and transportation as appropriate     Problem: Safety - Adult  Goal: Free from fall injury  Outcome: Adequate for Discharge     Problem: ABCDS Injury Assessment  Goal: Absence of physical injury  Outcome: Adequate for Discharge     Problem: Pain  Goal: Verbalizes/displays adequate comfort level or baseline comfort level  Outcome: Adequate for Discharge

## 2023-07-13 NOTE — DISCHARGE SUMMARY
Hospitalist Discharge Summary   Admit Date:  7/10/2023  5:08 PM   DC Note date: 2023  Name:  Temo Alexandre   Age:  80 y.o. Sex:  female  :  1927   MRN:  [id number]   Room:  [facility]  PCP:  Yessenia Huizar MD    Presenting Complaint: Fall and Neck Pain     Initial Admission Diagnosis: Syncope and collapse [R55]  Elevated troponin [R77.8]  NSTEMI (non-ST elevated myocardial infarction) ([address]) [I21.4]  Nonrheumatic aortic valve stenosis [I35.0]  Closed odontoid fracture with type II morphology and posterior displacement, initial encounter ([address]) [S12.111A]     Problem List for this Hospitalization (present on admission):    Principal Problem (Resolved):    Syncope and collapse  Active Problems:    Essential hypertension    Elevated troponin    Mixed hyperlipidemia    Nonrheumatic aortic valve stenosis    Nonrheumatic mitral valve regurgitation    Closed posterior displaced Type II dens fracture [facility])  Resolved Problems:    Hyponatremia      Hospital Course:  Temo Alexandre is a 80 y.o. female with a h/o aortic stenosis, HTN who was admitted to our service on 7/10 after a fall at home. Says she blacked out prior to the fall. CT C-spine showed acute type II dens fracture with 2 mm posterior subluxation of C1 relative to C2 with associated epidural and prevertebral hematoma without high-grade spinal canal stenosis. Neurosurgery was consulted and patient deemed non-surgical based on age so she was placed in an [place] collar indefinitely. MRI C-spine was limited by motion artifact. Cardiology was consulted for syncope. No significant arrhythmias on telemetry aside from intermittent sinus bradycardia in the high 50s. She has not had any further dizziness or syncope/near-syncope. She had a mild PLACIDO on admission that resolved quickly. Worked well with [employer] and deemed stable for home with [address] [place] [address],[address]. He daughter lives nearby and patient has a neighbor who checks on her frequently.  D/w Cardiology NP who notified

## 2023-07-13 NOTE — TELEPHONE ENCOUNTER
Appointment given post hospital- will message Victor Manuel Kuhn if patient needs xrays 1 hr prior to office appointment

## 2023-07-13 NOTE — DISCHARGE INSTRUCTIONS
Go to [facility] Cardiology office in [place] immediately following discharge for Holter monitor.

## 2023-07-13 NOTE — CARE COORDINATION
Patient's progress discussed in IDR. Patient to discharge today with plan for C-collar at all times. Orthotist replaced C-collar on 7/12 for better fit. PT recommending home health. Patient is agreeable and prefers [facility]. Order placed and referral sent. Daughter to transport home. DCP: Home with [facility] ( RN, PT,OT).

## 2023-07-24 ENCOUNTER — TELEPHONE (OUTPATIENT)
Age: 88
End: 2023-07-24

## 2023-07-24 NOTE — TELEPHONE ENCOUNTER
I called pt, she stated that the monitor and phone are not charging and that the sticker was breaking her out. Pt has not put the monitor back on. Pt could not scheduled a nurse visit because the person who transports her is not there. I told her to call us back when she can to schedule a nurse visit. Pt may need to wear a zio.

## 2023-07-24 NOTE — TELEPHONE ENCOUNTER
Pt states her event monitor isn't charging and is breaking out her chest. Call disconnected before further questions could be asked.

## 2023-07-26 ENCOUNTER — HOSPITAL ENCOUNTER (OUTPATIENT)
Dept: CT IMAGING | Age: 88
Discharge: HOME | End: 2023-07-29
Payer: MEDICARE

## 2023-07-26 DIAGNOSIS — S12.111D CLOSED ODONTOID FRACTURE WITH TYPE II MORPHOLOGY, POSTERIOR DISPLACEMENT, AND ROUTINE HEALING, SUBSEQUENT ENCOUNTER: ICD-10-CM

## 2023-07-26 PROCEDURE — 72125 CT NECK SPINE W/O DYE: CPT

## 2023-07-31 ENCOUNTER — TELEPHONE (OUTPATIENT)
Age: 88
End: 2023-07-31

## 2023-07-31 NOTE — TELEPHONE ENCOUNTER
Patient called and said she has sent her monitor back. She said she could not wear it because she was allergic to the tape and wants to talk to Dr. Melita Goetz personally.

## 2023-08-09 ENCOUNTER — OFFICE VISIT (OUTPATIENT)
Dept: NEUROSURGERY | Age: 88
End: 2023-08-09
Payer: MEDICARE

## 2023-08-09 VITALS
HEIGHT: 59 IN | TEMPERATURE: 97.1 F | WEIGHT: 119 LBS | HEART RATE: 64 BPM | DIASTOLIC BLOOD PRESSURE: 53 MMHG | BODY MASS INDEX: 23.99 KG/M2 | SYSTOLIC BLOOD PRESSURE: 154 MMHG | OXYGEN SATURATION: 95 %

## 2023-08-09 DIAGNOSIS — S12.110D CLOSED ODONTOID FRACTURE WITH TYPE II MORPHOLOGY, ANTERIOR DISPLACEMENT, AND ROUTINE HEALING, SUBSEQUENT ENCOUNTER: Primary | ICD-10-CM

## 2023-08-09 PROCEDURE — 99213 OFFICE O/P EST LOW 20 MIN: CPT | Performed by: NURSE PRACTITIONER

## 2023-08-09 PROCEDURE — 1090F PRES/ABSN URINE INCON ASSESS: CPT | Performed by: NURSE PRACTITIONER

## 2023-08-09 PROCEDURE — G8427 DOCREV CUR MEDS BY ELIG CLIN: HCPCS | Performed by: NURSE PRACTITIONER

## 2023-08-09 PROCEDURE — 1036F TOBACCO NON-USER: CPT | Performed by: NURSE PRACTITIONER

## 2023-08-09 PROCEDURE — 1123F ACP DISCUSS/DSCN MKR DOCD: CPT | Performed by: NURSE PRACTITIONER

## 2023-08-09 PROCEDURE — G8420 CALC BMI NORM PARAMETERS: HCPCS | Performed by: NURSE PRACTITIONER

## 2023-08-09 PROCEDURE — 1111F DSCHRG MED/CURRENT MED MERGE: CPT | Performed by: NURSE PRACTITIONER

## 2023-08-09 NOTE — PROGRESS NOTES
Respiratory: No chronic or frequent coughs, spitting up blood, shortness of breath, No asthma, or wheezing. Gastrointestinal: No a bdominal pain, heartburn, nausea, vomiting, constipation, or frequent diarrhea     Genitourinary: No frequent urination, burning or painful urination, or blood in urine     Musculoskeletal:   POS joint pain, stiffness/swelling, POS weakness of muscles, or POSmuscle pain/cramp     Integument:   No rash/itching     Neurological:   Dizziness/vertigo, No numbness/tingling sensation, tremors, No weakness in limbs, frequent or recurring headaches, memory loss or confusion. Physical Exam:    General: No acute distress  Head normocephalic and atraumatic  Mood and affect appropriate  CV: Regular rate   Resp: No increased work of breathing  Skin: warm and dry   Awake, alert, and oriented   Speech fluent  Eyes open spontaneously   Face symmetric and tongue midline on protrusion  Sternocleidomastoid and trapezius 5/5  No mid-line cervical, thoracic, or lumbar tenderness to palpation   Patient with strength exam as follows:   Upper Extremities: Right Left      Deltoid  5 5    Biceps  5 5    Triceps  5 5      5 5   Hand Intrinsics  5 5  Wrist flexors/extensors  5 5     Lower Extremities:      Hip Flex 5 5    Quads  5 5    Hamstrings 5 5    Dorsiflex 5 5    Plantarflex 5 5    EHL  5 5  Sensation intact to light touch and pin-prick   DTR 2+  No clonus or babinski present   No Pisano's sign present bilaterally   Gait with a cane normal        Notes are transcribed with [employer], a medical voice recording dictation service, and may contain minor errors.     Andrew Barr NP  [address] [place] [address]

## 2023-08-12 PROBLEM — R79.89 ELEVATED TROPONIN: Status: RESOLVED | Noted: 2022-11-23 | Resolved: 2023-08-12

## 2023-08-12 PROBLEM — R77.8 ELEVATED TROPONIN: Status: RESOLVED | Noted: 2022-11-23 | Resolved: 2023-08-12

## 2023-09-19 ENCOUNTER — HOSPITAL ENCOUNTER (OUTPATIENT)
Dept: GENERAL RADIOLOGY | Age: 88
Discharge: HOME | End: 2023-09-22
Payer: MEDICARE

## 2023-09-19 ENCOUNTER — OFFICE VISIT (OUTPATIENT)
Dept: NEUROSURGERY | Age: 88
End: 2023-09-19
Payer: MEDICARE

## 2023-09-19 VITALS
HEART RATE: 75 BPM | SYSTOLIC BLOOD PRESSURE: 163 MMHG | DIASTOLIC BLOOD PRESSURE: 53 MMHG | OXYGEN SATURATION: 96 % | BODY MASS INDEX: 24.19 KG/M2 | HEIGHT: 59 IN | TEMPERATURE: 97.3 F | WEIGHT: 120 LBS

## 2023-09-19 DIAGNOSIS — S12.110D CLOSED ODONTOID FRACTURE WITH TYPE II MORPHOLOGY, ANTERIOR DISPLACEMENT, AND ROUTINE HEALING, SUBSEQUENT ENCOUNTER: ICD-10-CM

## 2023-09-19 DIAGNOSIS — M43.6 TORTICOLLIS: ICD-10-CM

## 2023-09-19 DIAGNOSIS — S12.111D CLOSED ODONTOID FRACTURE WITH TYPE II MORPHOLOGY, POSTERIOR DISPLACEMENT, AND ROUTINE HEALING, SUBSEQUENT ENCOUNTER: Primary | ICD-10-CM

## 2023-09-19 PROCEDURE — 1123F ACP DISCUSS/DSCN MKR DOCD: CPT | Performed by: NEUROLOGICAL SURGERY

## 2023-09-19 PROCEDURE — 72040 X-RAY EXAM NECK SPINE 2-3 VW: CPT

## 2023-09-19 PROCEDURE — G8420 CALC BMI NORM PARAMETERS: HCPCS | Performed by: NEUROLOGICAL SURGERY

## 2023-09-19 PROCEDURE — 99213 OFFICE O/P EST LOW 20 MIN: CPT | Performed by: NEUROLOGICAL SURGERY

## 2023-09-19 PROCEDURE — G8427 DOCREV CUR MEDS BY ELIG CLIN: HCPCS | Performed by: NEUROLOGICAL SURGERY

## 2023-09-19 PROCEDURE — 1036F TOBACCO NON-USER: CPT | Performed by: NEUROLOGICAL SURGERY

## 2023-09-19 PROCEDURE — 1090F PRES/ABSN URINE INCON ASSESS: CPT | Performed by: NEUROLOGICAL SURGERY

## 2025-02-06 ENCOUNTER — TELEPHONE (OUTPATIENT)
Dept: INTERNAL MEDICINE CLINIC | Facility: CLINIC | Age: 89
End: 2025-02-06

## 2025-02-06 NOTE — TELEPHONE ENCOUNTER
Mailed NP letter on 2-6-25 with appt reminder, date/time and address of our location, also a release for information from previous PCP

## 2025-03-15 NOTE — PROGRESS NOTES
Susan Patel (:  1927) is a 89 y.o. female,New patient, here for evaluation of the following chief complaint(s):  Medicare AWV         Assessment & Plan  Medicare annual wellness visit, subsequent  Medicare wellness responses reviewed in the office today  Defer preventative cancer screening given age and comorbidities  Up-to-date on tetanus vaccination         Essential hypertension  Continue amlodipine, losartan, furosemide  Monitor serum potassium closely given CKD and use of ARB    Orders:    Comprehensive Metabolic Panel; Future    CBC with Auto Differential; Future    Lipid Panel; Future    Albumin/Creatinine Ratio, Urine; Future    TSH; Future    Urinalysis; Future    amLODIPine (NORVASC) 10 MG tablet; Take 1 tablet by mouth daily    losartan (COZAAR) 100 MG tablet; Take 1 tablet by mouth daily for blood pressure    Dyslipidemia  Check lipid panel  Continue atorvastatin    Orders:    Comprehensive Metabolic Panel; Future    CBC with Auto Differential; Future    Lipid Panel; Future    TSH; Future    atorvastatin (LIPITOR) 20 MG tablet; Take 1 tablet by mouth daily    Stage 3 chronic kidney disease, unspecified whether stage 3a or 3b CKD (HCC)  Likely multifactorial in setting of age-related decline in renal function and hypertension  Urinalysis from 3/14/2024 unremarkable  Monitor volume status, avoid nephrotoxic agents    Orders:    Comprehensive Metabolic Panel; Future    CBC with Auto Differential; Future    Albumin/Creatinine Ratio, Urine; Future    Urinalysis; Future    Dyspnea on exertion  EKG in office today shows sinus bradycardia with PAC with heart rate of 50 bpm, normal axis, tall T waves in V2  Check basic labs in the next week including electrolytes  Cardiac monitor from 2023 showed underlying rhythm of sinus with events of sinus bradycardia/SVT/normal sinus and sinus arrhythmia/sinus tachycardia.  1 patient triggered event showing normal sinus rhythm with ectopy  Echo from

## 2025-03-16 NOTE — ASSESSMENT & PLAN NOTE
Continue amlodipine, losartan, furosemide  Monitor serum potassium closely given CKD and use of ARB    Orders:    Comprehensive Metabolic Panel; Future    CBC with Auto Differential; Future    Lipid Panel; Future    Albumin/Creatinine Ratio, Urine; Future    TSH; Future    Urinalysis; Future    amLODIPine (NORVASC) 10 MG tablet; Take 1 tablet by mouth daily    losartan (COZAAR) 100 MG tablet; Take 1 tablet by mouth daily for blood pressure

## 2025-03-19 ENCOUNTER — OFFICE VISIT (OUTPATIENT)
Dept: INTERNAL MEDICINE CLINIC | Facility: CLINIC | Age: 89
End: 2025-03-19
Payer: MEDICARE

## 2025-03-19 VITALS
SYSTOLIC BLOOD PRESSURE: 148 MMHG | HEIGHT: 59 IN | RESPIRATION RATE: 15 BRPM | WEIGHT: 110.8 LBS | DIASTOLIC BLOOD PRESSURE: 54 MMHG | BODY MASS INDEX: 22.34 KG/M2 | HEART RATE: 66 BPM | TEMPERATURE: 97.9 F | OXYGEN SATURATION: 98 %

## 2025-03-19 DIAGNOSIS — N18.30 STAGE 3 CHRONIC KIDNEY DISEASE, UNSPECIFIED WHETHER STAGE 3A OR 3B CKD (HCC): ICD-10-CM

## 2025-03-19 DIAGNOSIS — M48.061 SPINAL STENOSIS OF LUMBAR REGION, UNSPECIFIED WHETHER NEUROGENIC CLAUDICATION PRESENT: ICD-10-CM

## 2025-03-19 DIAGNOSIS — E78.5 DYSLIPIDEMIA: ICD-10-CM

## 2025-03-19 DIAGNOSIS — M79.89 SWELLING OF BOTH LOWER EXTREMITIES: ICD-10-CM

## 2025-03-19 DIAGNOSIS — R06.09 DYSPNEA ON EXERTION: ICD-10-CM

## 2025-03-19 DIAGNOSIS — Z00.00 MEDICARE ANNUAL WELLNESS VISIT, SUBSEQUENT: Primary | ICD-10-CM

## 2025-03-19 DIAGNOSIS — I10 ESSENTIAL HYPERTENSION: ICD-10-CM

## 2025-03-19 DIAGNOSIS — K21.9 GASTROESOPHAGEAL REFLUX DISEASE, UNSPECIFIED WHETHER ESOPHAGITIS PRESENT: ICD-10-CM

## 2025-03-19 PROCEDURE — 1123F ACP DISCUSS/DSCN MKR DOCD: CPT | Performed by: STUDENT IN AN ORGANIZED HEALTH CARE EDUCATION/TRAINING PROGRAM

## 2025-03-19 PROCEDURE — G8420 CALC BMI NORM PARAMETERS: HCPCS | Performed by: STUDENT IN AN ORGANIZED HEALTH CARE EDUCATION/TRAINING PROGRAM

## 2025-03-19 PROCEDURE — 1126F AMNT PAIN NOTED NONE PRSNT: CPT | Performed by: STUDENT IN AN ORGANIZED HEALTH CARE EDUCATION/TRAINING PROGRAM

## 2025-03-19 PROCEDURE — 1090F PRES/ABSN URINE INCON ASSESS: CPT | Performed by: STUDENT IN AN ORGANIZED HEALTH CARE EDUCATION/TRAINING PROGRAM

## 2025-03-19 PROCEDURE — 93000 ELECTROCARDIOGRAM COMPLETE: CPT | Performed by: STUDENT IN AN ORGANIZED HEALTH CARE EDUCATION/TRAINING PROGRAM

## 2025-03-19 PROCEDURE — 99213 OFFICE O/P EST LOW 20 MIN: CPT | Performed by: STUDENT IN AN ORGANIZED HEALTH CARE EDUCATION/TRAINING PROGRAM

## 2025-03-19 PROCEDURE — 1036F TOBACCO NON-USER: CPT | Performed by: STUDENT IN AN ORGANIZED HEALTH CARE EDUCATION/TRAINING PROGRAM

## 2025-03-19 PROCEDURE — G8427 DOCREV CUR MEDS BY ELIG CLIN: HCPCS | Performed by: STUDENT IN AN ORGANIZED HEALTH CARE EDUCATION/TRAINING PROGRAM

## 2025-03-19 PROCEDURE — G0439 PPPS, SUBSEQ VISIT: HCPCS | Performed by: STUDENT IN AN ORGANIZED HEALTH CARE EDUCATION/TRAINING PROGRAM

## 2025-03-19 PROCEDURE — 1159F MED LIST DOCD IN RCRD: CPT | Performed by: STUDENT IN AN ORGANIZED HEALTH CARE EDUCATION/TRAINING PROGRAM

## 2025-03-19 RX ORDER — ESOMEPRAZOLE MAGNESIUM 20 MG/1
20 GRANULE, DELAYED RELEASE ORAL DAILY
Qty: 30 PACKET | Status: CANCELLED | OUTPATIENT
Start: 2025-03-19

## 2025-03-19 RX ORDER — HYDRALAZINE HYDROCHLORIDE 25 MG/1
25 TABLET, FILM COATED ORAL 3 TIMES DAILY PRN
Qty: 270 TABLET | Refills: 3 | Status: CANCELLED | OUTPATIENT
Start: 2025-03-19

## 2025-03-19 RX ORDER — AMLODIPINE BESYLATE 10 MG/1
10 TABLET ORAL DAILY
Qty: 90 TABLET | Refills: 2 | Status: SHIPPED | OUTPATIENT
Start: 2025-03-19

## 2025-03-19 RX ORDER — FUROSEMIDE 20 MG/1
20 TABLET ORAL 2 TIMES DAILY
Qty: 180 TABLET | Refills: 2 | Status: SHIPPED | OUTPATIENT
Start: 2025-03-19

## 2025-03-19 RX ORDER — LOSARTAN POTASSIUM 100 MG/1
100 TABLET ORAL DAILY
Qty: 90 TABLET | Refills: 2 | Status: SHIPPED | OUTPATIENT
Start: 2025-03-19

## 2025-03-19 RX ORDER — ALBUTEROL SULFATE 90 UG/1
1 INHALANT RESPIRATORY (INHALATION) EVERY 4 HOURS PRN
Qty: 18 G | Refills: 2 | Status: SHIPPED | OUTPATIENT
Start: 2025-03-19

## 2025-03-19 RX ORDER — ATORVASTATIN CALCIUM 20 MG/1
20 TABLET, FILM COATED ORAL DAILY
Qty: 90 TABLET | Refills: 2 | Status: SHIPPED | OUTPATIENT
Start: 2025-03-19

## 2025-03-19 ASSESSMENT — PATIENT HEALTH QUESTIONNAIRE - PHQ9
SUM OF ALL RESPONSES TO PHQ QUESTIONS 1-9: 4
9. THOUGHTS THAT YOU WOULD BE BETTER OFF DEAD, OR OF HURTING YOURSELF: NOT AT ALL
8. MOVING OR SPEAKING SO SLOWLY THAT OTHER PEOPLE COULD HAVE NOTICED. OR THE OPPOSITE, BEING SO FIGETY OR RESTLESS THAT YOU HAVE BEEN MOVING AROUND A LOT MORE THAN USUAL: NOT AT ALL
7. TROUBLE CONCENTRATING ON THINGS, SUCH AS READING THE NEWSPAPER OR WATCHING TELEVISION: NOT AT ALL
3. TROUBLE FALLING OR STAYING ASLEEP: SEVERAL DAYS
10. IF YOU CHECKED OFF ANY PROBLEMS, HOW DIFFICULT HAVE THESE PROBLEMS MADE IT FOR YOU TO DO YOUR WORK, TAKE CARE OF THINGS AT HOME, OR GET ALONG WITH OTHER PEOPLE: NOT DIFFICULT AT ALL
2. FEELING DOWN, DEPRESSED OR HOPELESS: NOT AT ALL
4. FEELING TIRED OR HAVING LITTLE ENERGY: NEARLY EVERY DAY
SUM OF ALL RESPONSES TO PHQ QUESTIONS 1-9: 4
SUM OF ALL RESPONSES TO PHQ QUESTIONS 1-9: 4
6. FEELING BAD ABOUT YOURSELF - OR THAT YOU ARE A FAILURE OR HAVE LET YOURSELF OR YOUR FAMILY DOWN: NOT AT ALL
SUM OF ALL RESPONSES TO PHQ QUESTIONS 1-9: 4
5. POOR APPETITE OR OVEREATING: NOT AT ALL
1. LITTLE INTEREST OR PLEASURE IN DOING THINGS: NOT AT ALL

## 2025-03-19 ASSESSMENT — ENCOUNTER SYMPTOMS
SHORTNESS OF BREATH: 1
ANAL BLEEDING: 0
VOMITING: 0
BLOOD IN STOOL: 0
COUGH: 0
TROUBLE SWALLOWING: 1
BACK PAIN: 1
NAUSEA: 0
WHEEZING: 0

## 2025-03-19 NOTE — PROGRESS NOTES
Medicare Annual Wellness Visit    Susan Patel is here for Medicare AWV    Assessment & Plan   Medicare annual wellness visit, subsequent  Essential hypertension  Assessment & Plan:  Continue amlodipine, losartan, furosemide  Monitor serum potassium closely given CKD and use of ARB    Orders:    Comprehensive Metabolic Panel; Future    CBC with Auto Differential; Future    Lipid Panel; Future    Albumin/Creatinine Ratio, Urine; Future    TSH; Future    Urinalysis; Future    amLODIPine (NORVASC) 10 MG tablet; Take 1 tablet by mouth daily    losartan (COZAAR) 100 MG tablet; Take 1 tablet by mouth daily for blood pressure    Orders:  -     Comprehensive Metabolic Panel; Future  -     CBC with Auto Differential; Future  -     Lipid Panel; Future  -     Albumin/Creatinine Ratio, Urine; Future  -     TSH; Future  -     Urinalysis; Future  -     amLODIPine (NORVASC) 10 MG tablet; Take 1 tablet by mouth daily, Disp-90 tablet, R-2Normal  -     losartan (COZAAR) 100 MG tablet; Take 1 tablet by mouth daily for blood pressure, Disp-90 tablet, R-2Normal  Dyslipidemia  -     Comprehensive Metabolic Panel; Future  -     CBC with Auto Differential; Future  -     Lipid Panel; Future  -     TSH; Future  -     atorvastatin (LIPITOR) 20 MG tablet; Take 1 tablet by mouth daily, Disp-90 tablet, R-2Normal  Stage 3 chronic kidney disease, unspecified whether stage 3a or 3b CKD (HCC)  -     Comprehensive Metabolic Panel; Future  -     CBC with Auto Differential; Future  -     Albumin/Creatinine Ratio, Urine; Future  -     Urinalysis; Future  Dyspnea on exertion  -     EKG 12 Lead  -     albuterol sulfate HFA (PROVENTIL;VENTOLIN;PROAIR) 108 (90 Base) MCG/ACT inhaler; Inhale 1 puff into the lungs every 4 hours as needed for Shortness of Breath or Wheezing, Disp-18 g, R-2Normal  Gastroesophageal reflux disease, unspecified whether esophagitis present  Assessment & Plan:  History of esophageal tear during prior EGD on 12/13/2021  Continue

## 2025-03-19 NOTE — ASSESSMENT & PLAN NOTE
History of esophageal tear during prior EGD on 12/13/2021  Continue Nexium    Orders:    esomeprazole (NEXIUM) 20 MG delayed release capsule; Take 1 capsule by mouth daily

## 2025-03-19 NOTE — PATIENT INSTRUCTIONS
always ask your healthcare professional. [employer], [employer], disclaims any warranty or liability for your use of this information.    Personalized Preventive Plan for Susan Patel - 3/19/2025  Medicare offers a range of preventive health benefits. Some of the tests and screenings are paid in full while other may be subject to a deductible, co-insurance, and/or copay.  Some of these benefits include a comprehensive review of your medical history including lifestyle, illnesses that may run in your family, and various assessments and screenings as appropriate.  After reviewing your medical record and screening and assessments performed today your provider may have ordered immunizations, labs, imaging, and/or referrals for you.  A list of these orders (if applicable) as well as your Preventive Care list are included within your After Visit Summary for your review.

## 2025-04-02 ENCOUNTER — RESULTS FOLLOW-UP (OUTPATIENT)
Dept: INTERNAL MEDICINE CLINIC | Facility: CLINIC | Age: 89
End: 2025-04-02

## 2025-04-02 ENCOUNTER — LAB (OUTPATIENT)
Dept: INTERNAL MEDICINE CLINIC | Facility: CLINIC | Age: 89
End: 2025-04-02

## 2025-04-02 DIAGNOSIS — E78.5 DYSLIPIDEMIA: ICD-10-CM

## 2025-04-02 DIAGNOSIS — I10 ESSENTIAL HYPERTENSION: ICD-10-CM

## 2025-04-02 DIAGNOSIS — N18.30 STAGE 3 CHRONIC KIDNEY DISEASE, UNSPECIFIED WHETHER STAGE 3A OR 3B CKD (HCC): ICD-10-CM

## 2025-04-02 LAB
ALBUMIN SERPL-MCNC: 4.2 G/DL (ref 3.2–4.6)
ALBUMIN/GLOB SERPL: 1.2 (ref 1–1.9)
ALP SERPL-CCNC: 91 U/L (ref 35–104)
ALT SERPL-CCNC: 19 U/L (ref 8–45)
ANION GAP SERPL CALC-SCNC: 13 MMOL/L (ref 7–16)
APPEARANCE UR: CLEAR
AST SERPL-CCNC: 35 U/L (ref 15–37)
BACTERIA URNS QL MICRO: NEGATIVE /HPF
BASOPHILS # BLD: 0.07 K/UL (ref 0–0.2)
BASOPHILS NFR BLD: 1.1 % (ref 0–2)
BILIRUB SERPL-MCNC: 0.5 MG/DL (ref 0–1.2)
BILIRUB UR QL: NEGATIVE
BUN SERPL-MCNC: 29 MG/DL (ref 8–23)
CALCIUM SERPL-MCNC: 9.7 MG/DL (ref 8.8–10.2)
CHLORIDE SERPL-SCNC: 101 MMOL/L (ref 98–107)
CHOLEST SERPL-MCNC: 139 MG/DL (ref 0–200)
CO2 SERPL-SCNC: 25 MMOL/L (ref 20–29)
COLOR UR: ABNORMAL
CREAT SERPL-MCNC: 1.28 MG/DL (ref 0.6–1.1)
CREAT UR-MCNC: 76.4 MG/DL (ref 28–217)
DIFFERENTIAL METHOD BLD: ABNORMAL
EOSINOPHIL # BLD: 0.32 K/UL (ref 0–0.8)
EOSINOPHIL NFR BLD: 5.2 % (ref 0.5–7.8)
EPI CELLS #/AREA URNS HPF: ABNORMAL /HPF (ref 0–5)
ERYTHROCYTE [DISTWIDTH] IN BLOOD BY AUTOMATED COUNT: 12.7 % (ref 11.9–14.6)
GLOBULIN SER CALC-MCNC: 3.6 G/DL (ref 2.3–3.5)
GLUCOSE SERPL-MCNC: 91 MG/DL (ref 70–99)
GLUCOSE UR STRIP.AUTO-MCNC: NEGATIVE MG/DL
HCT VFR BLD AUTO: 34.2 % (ref 35.8–46.3)
HDLC SERPL-MCNC: 59 MG/DL (ref 40–60)
HDLC SERPL: 2.4 (ref 0–5)
HGB BLD-MCNC: 11.3 G/DL (ref 11.7–15.4)
HGB UR QL STRIP: NEGATIVE
HYALINE CASTS URNS QL MICRO: ABNORMAL /LPF
IMM GRANULOCYTES # BLD AUTO: 0.01 K/UL (ref 0–0.5)
IMM GRANULOCYTES NFR BLD AUTO: 0.2 % (ref 0–5)
KETONES UR QL STRIP.AUTO: NEGATIVE MG/DL
LDLC SERPL CALC-MCNC: 67 MG/DL (ref 0–100)
LEUKOCYTE ESTERASE UR QL STRIP.AUTO: ABNORMAL
LYMPHOCYTES # BLD: 1.69 K/UL (ref 0.5–4.6)
LYMPHOCYTES NFR BLD: 27.3 % (ref 13–44)
MCH RBC QN AUTO: 30.8 PG (ref 26.1–32.9)
MCHC RBC AUTO-ENTMCNC: 33 G/DL (ref 31.4–35)
MCV RBC AUTO: 93.2 FL (ref 82–102)
MICROALBUMIN UR-MCNC: 5.39 MG/DL (ref 0–20)
MICROALBUMIN/CREAT UR-RTO: 71 MG/G (ref 0–30)
MONOCYTES # BLD: 0.62 K/UL (ref 0.1–1.3)
MONOCYTES NFR BLD: 10 % (ref 4–12)
NEUTS SEG # BLD: 3.48 K/UL (ref 1.7–8.2)
NEUTS SEG NFR BLD: 56.2 % (ref 43–78)
NITRITE UR QL STRIP.AUTO: NEGATIVE
NRBC # BLD: 0 K/UL (ref 0–0.2)
PH UR STRIP: 6 (ref 5–9)
PLATELET # BLD AUTO: 213 K/UL (ref 150–450)
PMV BLD AUTO: 11.5 FL (ref 9.4–12.3)
POTASSIUM SERPL-SCNC: 4.6 MMOL/L (ref 3.5–5.1)
PROT SERPL-MCNC: 7.8 G/DL (ref 6.3–8.2)
PROT UR STRIP-MCNC: NEGATIVE MG/DL
RBC # BLD AUTO: 3.67 M/UL (ref 4.05–5.2)
RBC #/AREA URNS HPF: ABNORMAL /HPF (ref 0–5)
SODIUM SERPL-SCNC: 139 MMOL/L (ref 136–145)
SP GR UR REFRACTOMETRY: 1.01 (ref 1–1.02)
TRIGL SERPL-MCNC: 64 MG/DL (ref 0–150)
TSH, 3RD GENERATION: 7.19 UIU/ML (ref 0.27–4.2)
UROBILINOGEN UR QL STRIP.AUTO: 0.2 EU/DL (ref 0.2–1)
VLDLC SERPL CALC-MCNC: 13 MG/DL (ref 6–23)
WBC # BLD AUTO: 6.2 K/UL (ref 4.3–11.1)
WBC URNS QL MICRO: ABNORMAL /HPF (ref 0–4)

## 2025-07-01 ENCOUNTER — OFFICE VISIT (OUTPATIENT)
Dept: INTERNAL MEDICINE CLINIC | Facility: CLINIC | Age: 89
End: 2025-07-01
Payer: MEDICARE

## 2025-07-01 ENCOUNTER — LAB (OUTPATIENT)
Dept: INTERNAL MEDICINE CLINIC | Facility: CLINIC | Age: 89
End: 2025-07-01

## 2025-07-01 VITALS
DIASTOLIC BLOOD PRESSURE: 40 MMHG | BODY MASS INDEX: 21.77 KG/M2 | TEMPERATURE: 97.8 F | SYSTOLIC BLOOD PRESSURE: 144 MMHG | HEART RATE: 47 BPM | HEIGHT: 59 IN | OXYGEN SATURATION: 98 % | WEIGHT: 108 LBS

## 2025-07-01 DIAGNOSIS — N18.30 STAGE 3 CHRONIC KIDNEY DISEASE, UNSPECIFIED WHETHER STAGE 3A OR 3B CKD (HCC): ICD-10-CM

## 2025-07-01 DIAGNOSIS — K21.9 GASTROESOPHAGEAL REFLUX DISEASE, UNSPECIFIED WHETHER ESOPHAGITIS PRESENT: ICD-10-CM

## 2025-07-01 DIAGNOSIS — I10 ESSENTIAL HYPERTENSION: Primary | ICD-10-CM

## 2025-07-01 DIAGNOSIS — E03.9 ACQUIRED HYPOTHYROIDISM: ICD-10-CM

## 2025-07-01 DIAGNOSIS — R13.10 DYSPHAGIA, UNSPECIFIED TYPE: ICD-10-CM

## 2025-07-01 DIAGNOSIS — I10 ESSENTIAL HYPERTENSION: ICD-10-CM

## 2025-07-01 DIAGNOSIS — E03.8 SUBCLINICAL HYPOTHYROIDISM: ICD-10-CM

## 2025-07-01 PROBLEM — C44.02 SQUAMOUS CELL CARCINOMA OF LIP: Status: ACTIVE | Noted: 2025-07-01

## 2025-07-01 PROBLEM — M81.0 OSTEOPOROSIS: Status: ACTIVE | Noted: 2018-04-04

## 2025-07-01 PROBLEM — J68.3 REACTIVE AIRWAYS DYSFUNCTION SYNDROME (HCC): Status: ACTIVE | Noted: 2019-01-24

## 2025-07-01 PROBLEM — E78.00 HYPERCHOLESTEROLEMIA: Status: ACTIVE | Noted: 2023-07-10

## 2025-07-01 PROBLEM — N18.31 CHRONIC KIDNEY DISEASE, STAGE 3A (HCC): Status: ACTIVE | Noted: 2023-03-07

## 2025-07-01 LAB
ALBUMIN SERPL-MCNC: 4 G/DL (ref 3.2–4.6)
ALBUMIN/GLOB SERPL: 1.2 (ref 1–1.9)
ALP SERPL-CCNC: 95 U/L (ref 35–104)
ALT SERPL-CCNC: 17 U/L (ref 8–45)
ANION GAP SERPL CALC-SCNC: 13 MMOL/L (ref 7–16)
AST SERPL-CCNC: 26 U/L (ref 15–37)
BILIRUB SERPL-MCNC: 0.3 MG/DL (ref 0–1.2)
BUN SERPL-MCNC: 37 MG/DL (ref 8–23)
CALCIUM SERPL-MCNC: 9.7 MG/DL (ref 8.8–10.2)
CHLORIDE SERPL-SCNC: 100 MMOL/L (ref 98–107)
CO2 SERPL-SCNC: 22 MMOL/L (ref 20–29)
CREAT SERPL-MCNC: 1.54 MG/DL (ref 0.6–1.1)
GLOBULIN SER CALC-MCNC: 3.4 G/DL (ref 2.3–3.5)
GLUCOSE SERPL-MCNC: 85 MG/DL (ref 70–99)
POTASSIUM SERPL-SCNC: 4.9 MMOL/L (ref 3.5–5.1)
PROT SERPL-MCNC: 7.4 G/DL (ref 6.3–8.2)
SODIUM SERPL-SCNC: 135 MMOL/L (ref 136–145)
T4 FREE SERPL-MCNC: 1 NG/DL (ref 0.9–1.7)
TSH W FREE THYROID IF ABNORMAL: 5.57 UIU/ML (ref 0.27–4.2)

## 2025-07-01 PROCEDURE — 1159F MED LIST DOCD IN RCRD: CPT | Performed by: PHYSICIAN ASSISTANT

## 2025-07-01 PROCEDURE — G8427 DOCREV CUR MEDS BY ELIG CLIN: HCPCS | Performed by: PHYSICIAN ASSISTANT

## 2025-07-01 PROCEDURE — 1090F PRES/ABSN URINE INCON ASSESS: CPT | Performed by: PHYSICIAN ASSISTANT

## 2025-07-01 PROCEDURE — G8420 CALC BMI NORM PARAMETERS: HCPCS | Performed by: PHYSICIAN ASSISTANT

## 2025-07-01 PROCEDURE — 99214 OFFICE O/P EST MOD 30 MIN: CPT | Performed by: PHYSICIAN ASSISTANT

## 2025-07-01 PROCEDURE — 1036F TOBACCO NON-USER: CPT | Performed by: PHYSICIAN ASSISTANT

## 2025-07-01 PROCEDURE — 1125F AMNT PAIN NOTED PAIN PRSNT: CPT | Performed by: PHYSICIAN ASSISTANT

## 2025-07-01 PROCEDURE — 1123F ACP DISCUSS/DSCN MKR DOCD: CPT | Performed by: PHYSICIAN ASSISTANT

## 2025-07-01 SDOH — ECONOMIC STABILITY: FOOD INSECURITY: WITHIN THE PAST 12 MONTHS, THE FOOD YOU BOUGHT JUST DIDN'T LAST AND YOU DIDN'T HAVE MONEY TO GET MORE.: NEVER TRUE

## 2025-07-01 SDOH — ECONOMIC STABILITY: FOOD INSECURITY: WITHIN THE PAST 12 MONTHS, YOU WORRIED THAT YOUR FOOD WOULD RUN OUT BEFORE YOU GOT MONEY TO BUY MORE.: NEVER TRUE

## 2025-07-01 ASSESSMENT — ENCOUNTER SYMPTOMS
SHORTNESS OF BREATH: 0
CHEST TIGHTNESS: 0

## 2025-07-01 NOTE — PROGRESS NOTES
Chief Complaint   Patient presents with    Hypertension     Routine f/u.    Hypothyroidism    Hyperlipidemia       HISTORY OF PRESENT ILLNESS:  Susan Patel is a very pleasant 89 y.o. female who presents for follow up of   Subclinical hypothyroidism- chronic, has not required medication. She denies changes in energy level, weight, bowel habits, heat/cold tolerance, neck swelling.   Lab Results   Component Value Date    TSH 7.190 (H) 04/02/2025    T4FREE 1.0 07/01/2025       2. Hypertension- stable. BP today in the office was 144/40. Reports that diastolic is always in hypotensive range. She denies experiencing CP, SOB, dizziness, syncopal episodes, palpitations. She does have lower extremity edema, which is well controlled with lasix. She also has CKD stage 3b.     Lab Results   Component Value Date    WBC 6.2 04/02/2025    HGB 11.3 (L) 04/02/2025    HCT 34.2 (L) 04/02/2025    MCV 93.2 04/02/2025     04/02/2025     Lab Results   Component Value Date     (L) 07/01/2025    K 4.9 07/01/2025     07/01/2025    CO2 22 07/01/2025    BUN 37 (H) 07/01/2025    CREATININE 1.54 (H) 07/01/2025    GLUCOSE 85 07/01/2025    CALCIUM 9.7 07/01/2025    BILITOT 0.3 07/01/2025    ALKPHOS 95 07/01/2025    AST 26 07/01/2025    ALT 17 07/01/2025    LABGLOM 31 (L) 07/01/2025    GFRAA >60 12/15/2021    AGRATIO 0.8 (L) 12/15/2021    GLOB 3.4 07/01/2025       Lab Results   Component Value Date    ALBCREAT 71 (H) 04/02/2025     3. GERD- mostly controlled with nexium but having issues with solid food dysphagia. She denies abdominal pain, n/v/d, constipation, bloody stool. Reports hx of esophageal ring and tear.         PAST MEDICAL HISTORY:   Current Outpatient Medications   Medication Sig    amLODIPine (NORVASC) 10 MG tablet Take 1 tablet by mouth daily    atorvastatin (LIPITOR) 20 MG tablet Take 1 tablet by mouth daily    furosemide (LASIX) 20 MG tablet Take 1 tablet by mouth 2 times daily    losartan (COZAAR) 100 MG

## 2025-07-02 ENCOUNTER — RESULTS FOLLOW-UP (OUTPATIENT)
Dept: INTERNAL MEDICINE CLINIC | Facility: CLINIC | Age: 89
End: 2025-07-02

## 2025-08-13 ENCOUNTER — OFFICE VISIT (OUTPATIENT)
Age: 89
End: 2025-08-13
Payer: MEDICARE

## 2025-08-13 VITALS
HEART RATE: 57 BPM | DIASTOLIC BLOOD PRESSURE: 53 MMHG | RESPIRATION RATE: 18 BRPM | OXYGEN SATURATION: 96 % | BODY MASS INDEX: 21.81 KG/M2 | HEIGHT: 59 IN | SYSTOLIC BLOOD PRESSURE: 171 MMHG

## 2025-08-13 DIAGNOSIS — Q39.4 ESOPHAGEAL WEB: ICD-10-CM

## 2025-08-13 DIAGNOSIS — R13.19 ESOPHAGEAL DYSPHAGIA: Primary | ICD-10-CM

## 2025-08-13 PROCEDURE — 1036F TOBACCO NON-USER: CPT | Performed by: PHYSICIAN ASSISTANT

## 2025-08-13 PROCEDURE — 1160F RVW MEDS BY RX/DR IN RCRD: CPT | Performed by: PHYSICIAN ASSISTANT

## 2025-08-13 PROCEDURE — 1159F MED LIST DOCD IN RCRD: CPT | Performed by: PHYSICIAN ASSISTANT

## 2025-08-13 PROCEDURE — G8427 DOCREV CUR MEDS BY ELIG CLIN: HCPCS | Performed by: PHYSICIAN ASSISTANT

## 2025-08-13 PROCEDURE — 1090F PRES/ABSN URINE INCON ASSESS: CPT | Performed by: PHYSICIAN ASSISTANT

## 2025-08-13 PROCEDURE — 99204 OFFICE O/P NEW MOD 45 MIN: CPT | Performed by: PHYSICIAN ASSISTANT

## 2025-08-13 PROCEDURE — G8420 CALC BMI NORM PARAMETERS: HCPCS | Performed by: PHYSICIAN ASSISTANT

## 2025-08-13 PROCEDURE — 1123F ACP DISCUSS/DSCN MKR DOCD: CPT | Performed by: PHYSICIAN ASSISTANT

## 2025-08-19 ENCOUNTER — HOSPITAL ENCOUNTER (OUTPATIENT)
Dept: GENERAL RADIOLOGY | Age: 89
Discharge: HOME | End: 2025-08-22
Payer: MEDICARE

## 2025-08-19 DIAGNOSIS — R13.19 ESOPHAGEAL DYSPHAGIA: ICD-10-CM

## 2025-08-19 PROCEDURE — 74221 X-RAY XM ESOPHAGUS 2CNTRST: CPT

## 2025-08-19 PROCEDURE — 2500000003 HC RX 250 WO HCPCS: Performed by: PHYSICIAN ASSISTANT

## 2025-08-19 PROCEDURE — 6370000000 HC RX 637 (ALT 250 FOR IP): Performed by: PHYSICIAN ASSISTANT

## 2025-08-19 RX ADMIN — BARIUM SULFATE 140 ML: 980 POWDER, FOR SUSPENSION ORAL at 09:33

## 2025-08-19 RX ADMIN — ANTACID/ANTIFLATULENT 1 EACH: 380; 550; 10; 10 GRANULE, EFFERVESCENT ORAL at 09:33

## 2025-08-19 RX ADMIN — BARIUM SULFATE 355 ML: 0.6 SUSPENSION ORAL at 09:32

## 2025-08-19 RX ADMIN — BARIUM SULFATE 1 TABLET: 700 TABLET ORAL at 09:33

## 2025-08-22 PROBLEM — Q39.4 ESOPHAGEAL WEB: Status: ACTIVE | Noted: 2025-08-22

## 2025-08-22 PROBLEM — R13.19 ESOPHAGEAL DYSPHAGIA: Status: ACTIVE | Noted: 2025-08-22

## (undated) DEVICE — CANNULA NSL ORAL AD FOR CAPNOFLEX CO2 O2 AIRLFE

## (undated) DEVICE — KENDALL RADIOLUCENT FOAM MONITORING ELECTRODE RECTANGULAR SHAPE: Brand: KENDALL

## (undated) DEVICE — CONNECTOR TBNG OD5-7MM O2 END DISP

## (undated) DEVICE — BLOCK BITE AD 60FR W/ VELC STRP ADDRESSES MOST PT AND